# Patient Record
Sex: FEMALE | Race: WHITE | NOT HISPANIC OR LATINO | Employment: OTHER | URBAN - METROPOLITAN AREA
[De-identification: names, ages, dates, MRNs, and addresses within clinical notes are randomized per-mention and may not be internally consistent; named-entity substitution may affect disease eponyms.]

---

## 2020-02-06 ENCOUNTER — TRANSFERRED RECORDS (OUTPATIENT)
Dept: HEALTH INFORMATION MANAGEMENT | Facility: CLINIC | Age: 65
End: 2020-02-06

## 2020-02-06 ENCOUNTER — MEDICAL CORRESPONDENCE (OUTPATIENT)
Dept: HEALTH INFORMATION MANAGEMENT | Facility: CLINIC | Age: 65
End: 2020-02-06

## 2020-02-26 ENCOUNTER — DOCUMENTATION ONLY (OUTPATIENT)
Dept: NEUROLOGY | Facility: CLINIC | Age: 65
End: 2020-02-26

## 2020-05-05 ENCOUNTER — TELEPHONE (OUTPATIENT)
Dept: NEUROPSYCHOLOGY | Facility: CLINIC | Age: 65
End: 2020-05-05

## 2020-05-12 ENCOUNTER — VIRTUAL VISIT (OUTPATIENT)
Dept: NEUROPSYCHOLOGY | Facility: CLINIC | Age: 65
End: 2020-05-12
Payer: MEDICARE

## 2020-05-12 DIAGNOSIS — R41.3 MEMORY LOSS: ICD-10-CM

## 2020-05-12 DIAGNOSIS — F32.A DEPRESSION, UNSPECIFIED DEPRESSION TYPE: ICD-10-CM

## 2020-05-12 DIAGNOSIS — F03.90 SENILE DEMENTIA, UNCOMPLICATED (H): Primary | ICD-10-CM

## 2020-05-12 NOTE — LETTER
"2020       RE: Tena Sy  [address] [address] [address]     Dear Colleague,    Thank you for referring your patient, Tena Sy, to the [facility] NEUROPSYCHOLOGY at [facility]. Please see a copy of my visit note below.    Tena Sy is a 64 year old female who is being evaluated via a billable video visit.      The patient has been notified of following:     \"This video visit will be conducted via a call between you and your physician/provider. We have found that certain health care needs can be provided without the need for an in-person physical exam.  This service lets us provide the care you need with a video conversation. Video visits are billed at different rates depending on your insurance coverage.  Please reach out to your insurance provider with any questions.    If during the course of the call the provider feels a video visit is not appropriate, you will not be charged for this service.\"    Patient has given verbal consent for Video visit? Yes    Patient would like the video invitation sent by: Send to e-mail at: No e-mail address on record    Will anyone else be joining your video visit? No    NOTE: [employer] platform experienced significant technological issues so [email or web address] was utilized instead.     Video-Visit Details    Type of service:  Video Visit    Video Start Time: 8:11 AM  Video End Time: 8:46 AM  Formal Testin:50-10:35 AM    Originating Location (pt. Location): Home    Distant Location (provider location):  [facility] NEUROPSYCHOLOGY     Platform used for Video Visit: [email or web address]    RE: Tena Sy  MR#: [id number]  : 1955  DOS: May 12, 2020  KOSTA:  May 12, 2020    Neuropsychology Laboratory  [address] [address] [phone]    NEUROPSYCHOLOGICAL CONSULTATION      REASON FOR REFERRAL:  Tena Sy is a 64 year old female with 12 years of education who was referred for a " neuropsychological evaluation by Dr. Bryant to assess her cognitive and emotional functioning secondary to. The evaluation was requested in order to document her current level of functioning, assist with the differential diagnosis and provide appropriate recommendations to the patient, family and treatment team. The patient was informed that the evaluation included multiple measures of performance and symptom validity, and she was encouraged to provide her best effort at all times.  The nature of the neuropsychological evaluation was also discussed, including limits of confidentiality (for suspected child or elder abuse, potential homicide or suicide, and court orders). The patient was also informed that the report would be placed on the electronic medical records system. The patient was also given an opportunity to ask questions. The patient indicated that she understood the information and consented to participate in the evaluation.    Due to circumstances that prevent in-person clinical visits, this assessment was conducted using telehealth methods (including remote audiovisual presentation of test instructions and test stimuli). The standard administration of these procedures involves in-person, face-to-face methods. The impact of applying non-standard administration methods has been evaluated only in part by scientific research. While every effort was made to simulate standard assessment practices, the diagnostic conclusions and recommendations for treatment provided in this report are being advanced with these reservations.    PROCEDURES:   Review of records and clinical interview  Mental Status-orientation, Wide Range Achievement Test-4, Wechsler Adult Intelligence Scale-IV, Nieevs Verbal Learning Test-Revised, Clock Drawing Test, Verbal Fluency Test, Geriatric Depression Scale, Hines Anxiety Inventory, ILS Health and Safety Questions, BDAE Complex Ideational Material, RBANS, Oral Trailmaking Test, Test of  "Sustained Attention and Tracking and [place] Naming Test (15 Item version)    REVIEW OF RECORDS: Medical records dated February 6, 2020 noted diagnoses of anxiety, depression, hallucinations, and memory problems.  The record stated that about 3 years ago she began to experience memory loss and difficulty performing tasks.  The records noted she also experiencing difficulty remembering information that was told to her.  Records indicated family members then began to notice memory difficulties.  The record stated that \"recently, she showed up at her daughter's place and [place].  This was unusual.  When her daughter asked what she was doing, she told her she was going to [place] to visit her daughter.  It turned out that it was the daughter who she was talking to.  After few minutes, when asked the same question she gave the same answer.\"  Records also indicate that over the past 2 years she has been experiencing visual hallucinations which \"she describes this as a streak of something black that moves in front of her visual field.  This can be in any part of the visual field.  Recent examination by ophthalmology revealed floaters.  More recently she has been hearing sirens and voices.  When seen by audiology she was found to have mild hearing loss.  She has a longstanding history of sleeping difficulties.  She becomes anxious at night.  She does talk during sleep.  However, she does not remember her dreams.\"  The records further noted a long history of depression and anxiety that have been getting worse recently.  The records referenced a CT scan, which was reportedly negative.  Records noted significant medical history including hypothyroidism, iron deficiency anemia, spherocytosis, hemochromatosis, osteoarthritis, gastroesophageal reflux disease, osteoporosis, and essential tremor.  Records noted she was being treated with sertraline, raloxifene, propranolol, omeprazole, fish oil, meclizine, Synthroid, hydroxyzine, " "ferrous sulfate, Carboxymethylcellulose, multivitamin, calcium and vitamin D3.     Medical records dated February 6, 2020 noted diagnoses of anxiety, depression, hallucinations, and memory problems.  The record stated that about 3 years ago she began to experience memory loss and difficulty performing tasks.  The records said she also difficulty remembering information that was told to her.  Record indicated family members then began to notice memory difficulties.  The records stated that \"recently, she showed up at her daughter's place and [place].  This was unusual.  When her daughter asked what she was doing, she told her she was going to [place] to visit her daughter.  It turned out that it was the daughter who she was talking to.  After few minutes, when asked the same question she gave the same answer.\"  Records also indicate that over the past 2 years she has been experiencing visual hallucinations which \"she describes this as a streak of something black that moves in front of her visual field.  This can be in any part of the visual field.  Recent examination by ophthalmology revealed floaters.  More recently she has been hearing sirens and voices.  When seen by audiology she was found to have mild hearing loss.  She has a longstanding history of sleeping difficulties.  She becomes anxious at night.  She does talk during sleep.  However, she does not remember her dreams.\"  The records further noted a long history of depression and anxiety that have been getting worse recently.  The records referenced a CT scan, which was reportedly negative.  Records noted significant medical history including hypothyroidism, iron deficiency anemia, spherocytosis, hemochromatosis, osteoarthritis, gastroesophageal reflux disease, osteoporosis, and essential tremor.  Records know she has a history of cataract surgery, cholecystectomy, total abdominal hysterectomy and bilateral salpingo-oophorectomy.      CLINICAL INTERVIEW: " The patient was interviewed via [email or web address] video platform because the [employer] video platform had technological issues.  The patient's daughter, Mamie, and her  were also present.  On interview, the patient acknowledged difficulty with her memory, consistent with medical records.  She initially noted memory difficulty over the past year, however her daughter stated that the patient began having difficulty at work about 3 years ago.  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  The patient confirmed this issue and indicated that she had to stop working because of these difficulties.  The daughter noted that the family first observed the patient's memory difficulties in the fall and winter of 2019.  The daughter provided one example when the patient was speaking to her but was also referring to her in the third person.  The patient reported that she has difficulty remembering where she places items.  The daughter also noted the patient will forget things that she has had previously.  Additionally, the daughter noted the patient will forget when she has visited people.  The daughter noted a recent example where the patient forgot a visit and conversation they had just 2 days later.  The patient noted occasional difficulty with attention and concentration, but denied difficulty with decision-making and problem-solving.  The patient reported that she currently does not drive, but she has a 's license and an automobile.  The daughter noted that the  is usually drives because he is also retired.  The patient noted that she manages finances along with her , and her  does the checkbook balancing.  The patient denied difficulty with managing her medications and indicated that she uses a pillbox.  She also denied any difficulty with basic activities of daily living such as cooking, cleaning, and other household  "chores.    The patient and her daughter noted the patient has a history of depression and anxiety.  The patient reported that these difficulties began around the time she retired about 3 years ago.  In terms of her current mood, the patient said it was \"about 50.\".  When asked to elaborate, the daughter said that about half of the patient's days are good and half the days she is in a bad mood.  The daughter also noted the patient gets \"a lot\" of sleep, sometimes sleeping till 11:00 AM or noon.  The patient noted that she typically goes to bed at 9 PM.  The patient indicated last night was \"rough\" because she was thinking about this appointment.  The patient characterized her appetite is \"fair.\"  The daughter noted that the patient will sometimes forget to eat or not want to eat.  For example, the daughter noted the patient has had 2 instances where she had to go to the emergency department because she fainted due to dehydration and not eating enough.  The patient denied any current contact with mental health professionals.  However, the daughter noted the patient has previously worked with a psychotherapist, but the psychotherapist felt she did not have to come back.  The patient denied any suicidal or homicidal ideation, denied any history of suicide attempts.  The patient acknowledged the hallucinations, the daughter noted that floaters likely accounted for the shadows that the patient was seen.  However, they also noted that the patient has experiences where she will occasionally hear family members speaking to her when the family members are not present.  The patient denied any use of alcohol, tobacco, or illicit substances.    Medically, the daughter noted the patient has a significant family history for dementia, including the patient's mother who was diagnosed with dementia at age 62 and the patient's older sister who was diagnosed with Alzheimer's disease, also at age 62.  They noted that the elder sister " recently passed away at age 69.  The daughter noted the patient also has a history of anemia and a blood disorder that has not been diagnosed.  The daughter noted the patient has a history of low iron and hemoglobin, but recently this is been running higher.  The daughter also noted that the last episode where the patient fainted she struck her head, but recent CT scans and MRI scans did not indicate evidence for central nervous system injury.  The patient denied history of other traumatic brain injury.  They also denied any history multiple sclerosis, stroke, seizures, hypercholesterolemia, sleep apnea, diabetes, heart disease, cancer, liver disease, or kidney disease for the patient.  The daughter noted the patient's takes medication for hypertension and has had bilateral cataract surgery.  The daughter also noted the patient takes levothyroxine for hypothyroidism.  They noted the patient is also being treated with propanolol, sertraline, hydroxyzine, raloxifene and an over the counter antacid.  The patient and her daughter noted that she has a mild tremor in her hand, and there is a family history of this tremor.  They noted that it was unclear if this was a neurological issue or related to anxiety, since it becomes worse when she is anxious.  The patient reported that she wears eyeglasses for reading and denied any hearing problems.    The patient reported that she graduated from high school and was in special education for all subjects when in school.  She reported that reading was her best subject but math was her worst subject.  She denied ever having to repeat a grade.  The patient noted that she has 2 children, including a son and a daughter, and 4 grandchildren.  The patient's daughter, Mamie, indicated that she helps the patient manage appointments and coordinate her care.  The patient indicated that she lives at home with her .  Her  noted that they will be  47 years in June.  When  "asked her age, the patient could not recall it, but her  noted she was 64 years old.    BEHAVIORAL OBSERVATIONS:  On examination, the patient was casually but appropriately dressed and groomed. The patient was cooperative with the evaluation and was talkative. The patient appeared to put forth her best effort on all tasks. Thus, the results of this evaluation are a reasonably valid reflection of the patient s current level of functioning. There were no indications of hallucinations, delusions or unusual thought processes. The patient was generally well oriented and her affect was appropriate.  The patient also made occasional paraphasic errors.  At one point the patient said \"what people say is overbound\" and at another point when asked about her mood she said it was \"about 50.\"  Per standard protocol, it was requested that the patient participate in the evaluation in a quiet room without distractions and be alone.  However, family members were observed on the video screen behind the patient had several points during formal testing.  At one point the patient became distracted by a family member, but otherwise she was attentive to the psychometrost and the test materials.    RESULTS: Formal performance validity measures were generally within expected limits and consistent with the above-noted observations.  Psychometric estimates of the patient's premorbid intellectual functioning were in the mildly impaired range based on single word reading ability and her vocabulary ability.  Single word reading ability was at the third grade level, which is generally consistent with her educational history of special education involvement while in school.    Measures of attention/concentration were generally in the impaired range.  For example, a digit span task was in the mildly impaired range.  A simple oral sequencing test integrates attention was also in the mildly to moderately impaired range.  Another measure of " timed attention and processing speed was in the severely impaired range.    Measures the patient's memory functioning were also in the impaired range.  For example, there was evidence for significant deficits with encoding, storing, and retrieving previously learned verbal information on both a word list learning task and a story memory task.  A recognition format did not improve her performance.  There was evidence for rapid forgetting of previously learned information.    Expressive language functioning was mildly variable, but generally consistent with her estimated levels of global cognitive functioning.  For example, confrontation naming was within expected limits.  Phonemic fluency was consistent with her estimated premorbid levels of global cognitive functioning, but semantic fluency was poorer than expected.  Receptive language functioning, based on a complex ideational material task, was also in the impaired range.  A clock drawing task indicated evidence for significant visual-spatial distortions.  Likewise, motor-free line orientation judgment and motor-free visual reasoning were in the impaired range.    Measures of the patient's executive functioning were also in the impaired range.  For example, verbal and visual reasoning abilities were in the mildly impaired range.  Additionally, verbal fluency was poorer than expected, particular for semantic fluency.  A oral sequencing task that integrates cognitive flexibility was in the severely impaired range and could not be completed by the patient.  Finally, a measure of functional awareness of basic health and safety issues was also in the impaired range.  For example, she was not able to articulate strategies of what to do if she could not hear people in a conversation, and she did not know what to do if she experienced rapid weight loss.  She also could not articulate to ways to safely cross a busy street and she could not articulate 3 ways to take care of  herself physically.    Assessment of the patient's emotional functioning was completed utilizing the clinical interview and self-report measures of depression and anxiety.  On the self-report measures, the patient reported severe symptoms of anxiety and mild depressive symptoms.  This is generally consistent with her self-report during the interview, and the family's report.    SUMMARY:  The following opinions and recommendations are based on the interview and formal testing data obtained via telephone, thus all results were interpreted with caution.  In summary, the neuropsychological assessment results indicated estimated premorbid intellectual functioning was in the mildly impaired range, but this is consistent with her reported history of special education involvement while in school and is unlikely to represent a decline in global cognitive functioning.  However, there was evidence for severe deficits with all aspects of memory functioning, including encoding, storing, and retrieving previously learned verbal information.  There was also evidence for rapid forgetting of previously learned information.  Attention/concentration, visual-spatial abilities, and executive functioning were also in the impaired range.  Language processing ability was relatively better but still variable. The extent of her cognitive deficits are greater than could be accounted for by her premorbid levels of global cognitive functioning and indicated a decline in several cognitive domains. 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  The specific etiology of her cognitive deficits could not be determined based upon this evaluation alone. Although she noted significant emotional distress including depression and anxiety, the pattern and extent of cognitive difficulties is greater than can be accounted for by psychiatric issues alone.  Nonetheless, the results  indicated the patient also met criteria for a depressive disorder with prominent anxiety symptoms. Other medical issues, such as hypothyroidism and sleep difficulties, could also be contributing to deficits her cognitive functioning but are unlikely to be the primary factor.    RECOMMENDATIONS:   1.  Given these results, continued neurological consultation and follow-up is strongly recommended.  2. Safety is a significant concern.  She will likely require a high level of supervision to her daily activities, and 24-hour supervision is recommended.  It is strongly recommended that the patient not reside alone.  3.  Given the patient's significant cognitive deficits, it is recommended that the patient avoid driving due to the high risk of accidents and/or becoming lost.  4.  Further, other potentially dangerous activities, such as cooking and use of power tools, should be avoided or closely supervised.  5. Pharmacological intervention to address the cognitive decline might be considered, if not medically contraindicated.    6. A full medical evaluation of any treatable causes of cognitive decline is also recommended, if this has not already been accomplished.  Given the significant cognitive decline, a full evaluation of any infectious processes, vitamin deficiencies or other metabolic abnormalities is recommended, to rule out these as possible contributors.   7. A referral for a support group for individuals with cognitive decline and their families, such as the Alzheimer s [employer], is also recommended. The web site for the [place] branch of this organization is https://www.alz.org/mnnd.   8.  A referral for psychiatric consultation is recommended given the extent of her emotional difficulties. Evaluation of psychotropic medications to address her depressive and anxiety symptoms should be considered.  9.  Psychotherapeutic intervention likely would also be helpful.  A highly structured behaviorally based  intervention focused on coping and stress management likely would be the most helpful for her.  Referral to a psychologist or psychotherapist with experiencing working with people with cognitive disorders likely would be the most helpful for her.  10. The patient would benefit from case management and in-home services to assist her in compensating for her difficulties with organizational ability and attention. Thus, a referral to the Elderly Waiver program is recommended, given her psychiatric and cognitive issues. Utilization of an adult rehabilitative mental health services (ARMHS) worker would be particularly beneficial for her.  11. Have a trusted loved one present during medical appointments to facilitate memory for information provided and/or obtain written outlines of information to reduce demands on verbal memory.  12. The results of the evaluation now constitute a baseline of the patient s cognitive functioning.  Given the evidence for significant deficits in several domains of cognitive functioning, a referral for a neuropsychological reevaluation in approximately 8-10 months is recommended in order to continue to clarify the differential diagnosis, document any changes in cognitive functioning, and provide further recommendations and prognosis to the patient, family and treatment team.    Results were provided in telephone calls to the patient and the patient's daughter Mamie (with the patient's permission) on 5//12/2020. Their questions were answered. Thank you for referring this interesting individual.  If you have any questions, please feel free to contact me.      Julio César Rudolph, PhD, ABPP, LP  Professor and Licensed Psychologist [id number]  Board Certified Clinical Neuropsychologist  Phone: [phone]  Fax: [phone]    Time Spent:    Minutes Date Code Units   Total Time-Neuropsychologist Professional 252 5/12/20 47175 1      54433 3   Neuropsychologist Admin/scoring 0 5/12/20 14007 0      13832  0   Diagnostic Interview 35 5/12/20 35392 1   Psychometrician Time-Test Administration/Score 159 5/12/20 32409 1      43294 4   Diagnosis F03.90 R41.3 F32.9

## 2020-05-12 NOTE — PROGRESS NOTES
The patient was seen for neuropsychological evaluation via telehealth at the request of Major Bryant MD for the purposes of diagnostic clarification and treatment planning.  159 minutes of video test administration and scoring were provided by this writer.  Please see Dr. Julio César Rudolph's report for a full interpretation of the findings.    Video Start Time: 9:00 AM  Video End Time: 10:35 AM    Monica Arroyo  Psychometrist

## 2020-05-12 NOTE — PROGRESS NOTES
"Tena Sy is a 64 year old female who is being evaluated via a billable video visit.      The patient has been notified of following:     \"This video visit will be conducted via a call between you and your physician/provider. We have found that certain health care needs can be provided without the need for an in-person physical exam.  This service lets us provide the care you need with a video conversation. Video visits are billed at different rates depending on your insurance coverage.  Please reach out to your insurance provider with any questions.    If during the course of the call the provider feels a video visit is not appropriate, you will not be charged for this service.\"    Patient has given verbal consent for Video visit? Yes    Patient would like the video invitation sent by: Send to e-mail at: No e-mail address on record    Will anyone else be joining your video visit? No    NOTE: [employer] video platform experienced significant technological issues so [email or web address] was utilized instead.     Video-Visit Details    Type of service:  Video Visit    Video Start Time: 8:11 AM  Video End Time: 8:46 AM  Formal Testin:50-10:35 AM    Originating Location (pt. Location): Home    Distant Location (provider location):  [facility]     Platform used for Video Visit: [email or web address]    RE: Tena Sy  MR#: [id number]  : 1955  DOS: May 12, 2020  KOSTA:  May 12, 2020    Ne[facility]ology [facility]  [address] [address] [phone]    NEUROPSYCHOLOGICAL CONSULTATION      REASON FOR REFERRAL:  Tena Sy is a 64 year old female with 12 years of education who was referred for a neuropsychological evaluation by Dr. Bryant to assess her cognitive and emotional functioning secondary to. The evaluation was requested in order to document her current level of functioning, assist with the differential diagnosis and provide appropriate recommendations to the " patient, family and treatment team. The patient was informed that the evaluation included multiple measures of performance and symptom validity, and she was encouraged to provide her best effort at all times.  The nature of the neuropsychological evaluation was also discussed, including limits of confidentiality (for suspected child or elder abuse, potential homicide or suicide, and court orders). The patient was also informed that the report would be placed on the electronic medical records system. The patient was also given an opportunity to ask questions. The patient indicated that she understood the information and consented to participate in the evaluation.    Due to circumstances that prevent in-person clinical visits, this assessment was conducted using telehealth methods (including remote audiovisual presentation of test instructions and test stimuli). The standard administration of these procedures involves in-person, face-to-face methods. The impact of applying non-standard administration methods has been evaluated only in part by scientific research. While every effort was made to simulate standard assessment practices, the diagnostic conclusions and recommendations for treatment provided in this report are being advanced with these reservations.    PROCEDURES:   Review of records and clinical interview  Mental Status-orientation, Wide Range Achievement Test-4, Wechsler Adult Intelligence Scale-IV, Nieves Verbal Learning Test-Revised, Clock Drawing Test, Verbal Fluency Test, Geriatric Depression Scale, Hines Anxiety Inventory, ILS Health and Safety Questions, BDAE Complex Ideational Material, RBANS, Oral Trailmaking Test, Test of Sustained Attention and Tracking and Merigold Naming Test (15 Item version)    REVIEW OF RECORDS: Medical records dated February 6, 2020 noted diagnoses of anxiety, depression, hallucinations, and memory problems.  The record stated that about 3 years ago she began to experience  "memory loss and difficulty performing tasks.  The records noted she also experiencing difficulty remembering information that was told to her.  Records indicated family members then began to notice memory difficulties.  The record stated that \"recently, she showed up at her daughter's place and [place].  This was unusual.  When her daughter asked what she was doing, she told her she was going to [place] to visit her daughter.  It turned out that it was the daughter who she was talking to.  After few minutes, when asked the same question she gave the same answer.\"  Records also indicate that over the past 2 years she has been experiencing visual hallucinations which \"she describes this as a streak of something black that moves in front of her visual field.  This can be in any part of the visual field.  Recent examination by ophthalmology revealed floaters.  More recently she has been hearing sirens and voices.  When seen by audiology she was found to have mild hearing loss.  She has a longstanding history of sleeping difficulties.  She becomes anxious at night.  She does talk during sleep.  However, she does not remember her dreams.\"  The records further noted a long history of depression and anxiety that have been getting worse recently.  The records referenced a CT scan, which was reportedly negative.  Records noted significant medical history including hypothyroidism, iron deficiency anemia, spherocytosis, hemochromatosis, osteoarthritis, gastroesophageal reflux disease, osteoporosis, and essential tremor.  Records noted she was being treated with sertraline, raloxifene, propranolol, omeprazole, fish oil, meclizine, Synthroid, hydroxyzine, ferrous sulfate, Carboxymethylcellulose, multivitamin, calcium and vitamin D3.     Medical records dated February 6, 2020 noted diagnoses of anxiety, depression, hallucinations, and memory problems.  The record stated that about 3 years ago she began to experience memory loss " "and difficulty performing tasks.  The records said she also difficulty remembering information that was told to her.  Record indicated family members then began to notice memory difficulties.  The records stated that \"recently, she showed up at her daughter's place and [place].  This was unusual.  When her daughter asked what she was doing, she told her she was going to [place] to visit her daughter.  It turned out that it was the daughter who she was talking to.  After few minutes, when asked the same question she gave the same answer.\"  Records also indicate that over the past 2 years she has been experiencing visual hallucinations which \"she describes this as a streak of something black that moves in front of her visual field.  This can be in any part of the visual field.  Recent examination by ophthalmology revealed floaters.  More recently she has been hearing sirens and voices.  When seen by audiology she was found to have mild hearing loss.  She has a longstanding history of sleeping difficulties.  She becomes anxious at night.  She does talk during sleep.  However, she does not remember her dreams.\"  The records further noted a long history of depression and anxiety that have been getting worse recently.  The records referenced a CT scan, which was reportedly negative.  Records noted significant medical history including hypothyroidism, iron deficiency anemia, spherocytosis, hemochromatosis, osteoarthritis, gastroesophageal reflux disease, osteoporosis, and essential tremor.  Records know she has a history of cataract surgery, cholecystectomy, total abdominal hysterectomy and bilateral salpingo-oophorectomy.      CLINICAL INTERVIEW: The patient was interviewed via [email or web address] video platform because the [employer] video platform had technological issues.  The patient's daughter, Mamie, and her  were also present.  On interview, the patient acknowledged difficulty with her memory, consistent with medical " "records.  She initially noted memory difficulty over the past year, however her daughter stated that the patient began having difficulty at work about 3 years ago.  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  The patient confirmed this issue and indicated that she had to stop working because of these difficulties.  The daughter noted that the family first observed the patient's memory difficulties in the fall and winter of 2019.  The daughter provided one example when the patient was speaking to her but was also referring to her in the third person.  The patient reported that she has difficulty remembering where she places items.  The daughter also noted the patient will forget things that she has had previously.  Additionally, the daughter noted the patient will forget when she has visited people.  The daughter noted a recent example where the patient forgot a visit and conversation they had just 2 days later.  The patient noted occasional difficulty with attention and concentration, but denied difficulty with decision-making and problem-solving.  The patient reported that she currently does not drive, but she has a 's license and an automobile.  The daughter noted that the  is usually drives because he is also retired.  The patient noted that she manages finances along with her , and her  does the checkbook balancing.  The patient denied difficulty with managing her medications and indicated that she uses a pillbox.  She also denied any difficulty with basic activities of daily living such as cooking, cleaning, and other household chores.    The patient and her daughter noted the patient has a history of depression and anxiety.  The patient reported that these difficulties began around the time she retired about 3 years ago.  In terms of her current mood, the patient said it was \"about 50.\".  When asked to " "elaborate, the daughter said that about half of the patient's days are good and half the days she is in a bad mood.  The daughter also noted the patient gets \"a lot\" of sleep, sometimes sleeping till 11:00 AM or noon.  The patient noted that she typically goes to bed at 9 PM.  The patient indicated last night was \"rough\" because she was thinking about this appointment.  The patient characterized her appetite is \"fair.\"  The daughter noted that the patient will sometimes forget to eat or not want to eat.  For example, the daughter noted the patient has had 2 instances where she had to go to the emergency department because she fainted due to dehydration and not eating enough.  The patient denied any current contact with mental health professionals.  However, the daughter noted the patient has previously worked with a psychotherapist, but the psychotherapist felt she did not have to come back.  The patient denied any suicidal or homicidal ideation, denied any history of suicide attempts.  The patient acknowledged the hallucinations, the daughter noted that floaters likely accounted for the shadows that the patient was seen.  However, they also noted that the patient has experiences where she will occasionally hear family members speaking to her when the family members are not present.  The patient denied any use of alcohol, tobacco, or illicit substances.    Medically, the daughter noted the patient has a significant family history for dementia, including the patient's mother who was diagnosed with dementia at age 62 and the patient's older sister who was diagnosed with Alzheimer's disease, also at age 62.  They noted that the elder sister recently passed away at age 69.  The daughter noted the patient also has a history of anemia and a blood disorder that has not been diagnosed.  The daughter noted the patient has a history of low iron and hemoglobin, but recently this is been running higher.  The daughter also noted " that the last episode where the patient fainted she struck her head, but recent CT scans and MRI scans did not indicate evidence for central nervous system injury.  The patient denied history of other traumatic brain injury.  They also denied any history multiple sclerosis, stroke, seizures, hypercholesterolemia, sleep apnea, diabetes, heart disease, cancer, liver disease, or kidney disease for the patient.  The daughter noted the patient's takes medication for hypertension and has had bilateral cataract surgery.  The daughter also noted the patient takes levothyroxine for hypothyroidism.  They noted the patient is also being treated with propanolol, sertraline, hydroxyzine, raloxifene and an over the counter antacid.  The patient and her daughter noted that she has a mild tremor in her hand, and there is a family history of this tremor.  They noted that it was unclear if this was a neurological issue or related to anxiety, since it becomes worse when she is anxious.  The patient reported that she wears eyeglasses for reading and denied any hearing problems.    The patient reported that she graduated from high school and was in special education for all subjects when in school.  She reported that reading was her best subject but math was her worst subject.  She denied ever having to repeat a grade.  The patient noted that she has 2 children, including a son and a daughter, and 4 grandchildren.  The patient's daughter, Mamie, indicated that she helps the patient manage appointments and coordinate her care.  The patient indicated that she lives at home with her .  Her  noted that they will be  47 years in June.  When asked her age, the patient could not recall it, but her  noted she was 64 years old.    BEHAVIORAL OBSERVATIONS:  On examination, the patient was casually but appropriately dressed and groomed. The patient was cooperative with the evaluation and was talkative. The patient  "appeared to put forth her best effort on all tasks. Thus, the results of this evaluation are a reasonably valid reflection of the patient s current level of functioning. There were no indications of hallucinations, delusions or unusual thought processes. The patient was generally well oriented and her affect was appropriate.  The patient also made occasional paraphasic errors.  At one point the patient said \"what people say is overbound\" and at another point when asked about her mood she said it was \"about 50.\"  Per standard protocol, it was requested that the patient participate in the evaluation in a quiet room without distractions and be alone.  However, family members were observed on the video screen behind the patient had several points during formal testing.  At one point the patient became distracted by a family member, but otherwise she was attentive to the psychometrost and the test materials.    RESULTS: Formal performance validity measures were generally within expected limits and consistent with the above-noted observations.  Psychometric estimates of the patient's premorbid intellectual functioning were in the mildly impaired range based on single word reading ability and her vocabulary ability.  Single word reading ability was at the third grade level, which is generally consistent with her educational history of special education involvement while in school.    Measures of attention/concentration were generally in the impaired range.  For example, a digit span task was in the mildly impaired range.  A simple oral sequencing test integrates attention was also in the mildly to moderately impaired range.  Another measure of timed attention and processing speed was in the severely impaired range.    Measures the patient's memory functioning were also in the impaired range.  For example, there was evidence for significant deficits with encoding, storing, and retrieving previously learned verbal " information on both a word list learning task and a story memory task.  A recognition format did not improve her performance.  There was evidence for rapid forgetting of previously learned information.    Expressive language functioning was mildly variable, but generally consistent with her estimated levels of global cognitive functioning.  For example, confrontation naming was within expected limits.  Phonemic fluency was consistent with her estimated premorbid levels of global cognitive functioning, but semantic fluency was poorer than expected.  Receptive language functioning, based on a complex ideational material task, was also in the impaired range.  A clock drawing task indicated evidence for significant visual-spatial distortions.  Likewise, motor-free line orientation judgment and motor-free visual reasoning were in the impaired range.    Measures of the patient's executive functioning were also in the impaired range.  For example, verbal and visual reasoning abilities were in the mildly impaired range.  Additionally, verbal fluency was poorer than expected, particular for semantic fluency.  A oral sequencing task that integrates cognitive flexibility was in the severely impaired range and could not be completed by the patient.  Finally, a measure of functional awareness of basic health and safety issues was also in the impaired range.  For example, she was not able to articulate strategies of what to do if she could not hear people in a conversation, and she did not know what to do if she experienced rapid weight loss.  She also could not articulate to ways to safely cross a busy street and she could not articulate 3 ways to take care of herself physically.    Assessment of the patient's emotional functioning was completed utilizing the clinical interview and self-report measures of depression and anxiety.  On the self-report measures, the patient reported severe symptoms of anxiety and mild depressive  symptoms.  This is generally consistent with her self-report during the interview, and the family's report.    SUMMARY:  The following opinions and recommendations are based on the interview and formal testing data obtained via telephone, thus all results were interpreted with caution.  In summary, the neuropsychological assessment results indicated estimated premorbid intellectual functioning was in the mildly impaired range, but this is consistent with her reported history of special education involvement while in school and is unlikely to represent a decline in global cognitive functioning.  However, there was evidence for severe deficits with all aspects of memory functioning, including encoding, storing, and retrieving previously learned verbal information.  There was also evidence for rapid forgetting of previously learned information.  Attention/concentration, visual-spatial abilities, and executive functioning were also in the impaired range.  Language processing ability was relatively better but still variable. The extent of her cognitive deficits are greater than could be accounted for by her premorbid levels of global cognitive functioning and indicated a decline in several cognitive domains. 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  The specific etiology of her cognitive deficits could not be determined based upon this evaluation alone. Although she noted significant emotional distress including depression and anxiety, the pattern and extent of cognitive difficulties is greater than can be accounted for by psychiatric issues alone.  Nonetheless, the results indicated the patient also met criteria for a depressive disorder with prominent anxiety symptoms. Other medical issues, such as hypothyroidism and sleep difficulties, could also be contributing to deficits her cognitive functioning but are unlikely to be the primary  factor.    RECOMMENDATIONS:   1.  Given these results, continued neurological consultation and follow-up is strongly recommended.  2. Safety is a significant concern.  She will likely require a high level of supervision to her daily activities, and 24-hour supervision is recommended.  It is strongly recommended that the patient not reside alone.  3.  Given the patient's significant cognitive deficits, it is recommended that the patient avoid driving due to the high risk of accidents and/or becoming lost.  4.  Further, other potentially dangerous activities, such as cooking and use of power tools, should be avoided or closely supervised.  5. Pharmacological intervention to address the cognitive decline might be considered, if not medically contraindicated.    6. A full medical evaluation of any treatable causes of cognitive decline is also recommended, if this has not already been accomplished.  Given the significant cognitive decline, a full evaluation of any infectious processes, vitamin deficiencies or other metabolic abnormalities is recommended, to rule out these as possible contributors.   7. A referral for a support group for individuals with cognitive decline and their families, such as the Alzheimer s [employer], is also recommended. The web site for the [place] branch of this organization is https://www.alz.org/mnnd.   8.  A referral for psychiatric consultation is recommended given the extent of her emotional difficulties. Evaluation of psychotropic medications to address her depressive and anxiety symptoms should be considered.  9.  Psychotherapeutic intervention likely would also be helpful.  A highly structured behaviorally based intervention focused on coping and stress management likely would be the most helpful for her.  Referral to a psychologist or psychotherapist with experiencing working with people with cognitive disorders likely would be the most helpful for her.  10. The patient would  benefit from case management and in-home services to assist her in compensating for her difficulties with organizational ability and attention. Thus, a referral to the Elderly Waiver program is recommended, given her psychiatric and cognitive issues. Utilization of an adult rehabilitative mental health services (ARMHS) worker would be particularly beneficial for her.  11. Have a trusted loved one present during medical appointments to facilitate memory for information provided and/or obtain written outlines of information to reduce demands on verbal memory.  12. The results of the evaluation now constitute a baseline of the patient s cognitive functioning.  Given the evidence for significant deficits in several domains of cognitive functioning, a referral for a neuropsychological reevaluation in approximately 8-10 months is recommended in order to continue to clarify the differential diagnosis, document any changes in cognitive functioning, and provide further recommendations and prognosis to the patient, family and treatment team.    Results were provided in telephone calls to the patient and the patient's daughter Mamie (with the patient's permission) on 5//12/2020. Their questions were answered. Thank you for referring this interesting individual.  If you have any questions, please feel free to contact me.      Julio César Rudolph, PhD, ABPP, LP  Professor and Licensed Psychologist [id number]  Board Certified Clinical Neuropsychologist  Phone: [phone]  Fax: [phone]    Time Spent:    Minutes Date Code Units   Total Time-Neuropsychologist Professional 252 5/12/20 63583 1      09368 3   Neuropsychologist Admin/scoring 0 5/12/20 [id number] 0      [id number] 0   Diagnostic Interview 35 5/12/20 21101 1   Psychometrician Time-Test Administration/Score 159 5/12/20 55949 1      [id number] 4   Diagnosis F03.90 R41.3 F32.9

## 2020-05-13 NOTE — PROGRESS NOTES
Face Sheet          Patient:  Tena Sy MRN:  [id number] :  51 GONZALEZ:  20   Education 12 (spec. ed) Handedness:   Provider DW Psychometrist:  NN   Orientation  WRAT-4  WAIS-IV Raw SS RDS   Personal Info 3 Raw 36 Digit Span 14 4 7   Place 1 SS 68 Vocabulary 15 5    Time -8 %ile 2 Matrix Reasoning 6 5    Presidents 1 Grade Equivalence 3.4 Similarities 7 3    BNT-15  COWAT - FAS  Animal Fluency      Raw 12 Raw 19 Raw 9     z -0.52 SS 5 SS 4     Total Stim Correct 0 T 32 T 27     Total Phonemic Correct 1         Complex Ideational Material  Clock Drawing  RBANS Line Orientation      Raw 8 Command 1 Raw 6     SS 3 Copy 1 z -3.66     T 15   %ile <2     Oral Trails    TSAT      Trails A 9 Z -2.60 Total time 236 Z -4.00   Trails B D/C 33 Z NA Total Errors 16 Z -5.48   HVLT           Trial 1 2   T-Score   T-Score   Trial 2 2 Total Recall 9 <20 True Positives 11    Trial 3 5 Delayed Recall 2 <20 False Positives 7    Learning 3 Percent Retention 40% <20 Discrim. Index 4 <20   RBANS Story           Total Immediate 10 z Score -2.40 Scaled Score 1     Total Delay 1 z Score -3.95 Scaled Score 2     ILS Health and Safety Questionnaire         Total 29 Classification Low       SHANDA          Total 44 Interpretation Severe       GDS          Total 12 Interpretation Mild/moderate

## 2025-02-17 RX ORDER — OMEPRAZOLE 20 MG/1
20 CAPSULE, DELAYED RELEASE ORAL
COMMUNITY

## 2025-02-17 RX ORDER — PRIMIDONE 50 MG/1
50 TABLET ORAL AT BEDTIME
COMMUNITY

## 2025-02-17 RX ORDER — LORAZEPAM 0.5 MG/1
0.5 TABLET ORAL DAILY PRN
COMMUNITY

## 2025-02-17 RX ORDER — PROPRANOLOL HYDROCHLORIDE 60 MG/1
60 CAPSULE, EXTENDED RELEASE ORAL DAILY
COMMUNITY

## 2025-02-17 RX ORDER — RALOXIFENE HYDROCHLORIDE 60 MG/1
60 TABLET, FILM COATED ORAL DAILY
COMMUNITY

## 2025-02-17 RX ORDER — LEVOTHYROXINE SODIUM 112 UG/1
112 TABLET ORAL
COMMUNITY

## 2025-02-17 RX ORDER — PSEUDOEPHEDRINE HCL 30 MG
2 TABLET ORAL 2 TIMES DAILY WITH MEALS
COMMUNITY

## 2025-02-17 RX ORDER — FOLIC ACID 0.8 MG
800 TABLET ORAL DAILY
COMMUNITY

## 2025-02-17 RX ORDER — MULTIVITAMIN
1 TABLET ORAL DAILY
COMMUNITY

## 2025-02-17 RX ORDER — CHLORAL HYDRATE 500 MG
1 CAPSULE ORAL DAILY
COMMUNITY

## 2025-02-17 RX ORDER — MEMANTINE HYDROCHLORIDE 14 MG/1
14 CAPSULE, EXTENDED RELEASE ORAL DAILY
COMMUNITY

## 2025-02-17 RX ORDER — ESCITALOPRAM OXALATE 20 MG/1
20 TABLET ORAL AT BEDTIME
COMMUNITY

## 2025-02-17 RX ORDER — MIRTAZAPINE 15 MG/1
15 TABLET, FILM COATED ORAL AT BEDTIME
COMMUNITY

## 2025-02-17 RX ORDER — DONEPEZIL HYDROCHLORIDE 10 MG/1
10 TABLET, FILM COATED ORAL DAILY
COMMUNITY

## 2025-02-17 NOTE — PROGRESS NOTES
PTA medications updated by Medication Scribe prior to surgery via phone call with patient (last doses completed by Nurse)     Medication history sources: Patient's family/friend (Daughter: Mamie) and H&P  In the past week, patient estimated taking medication this percent of the time: Greater than 90%      Significant changes made to the medication list:  None      Additional medication history information:   None    Medication reconciliation completed by provider prior to medication history? No    Time spent in this activity: 35 minutes    The information provided in this note is only as accurate as the sources available at the time of update(s)      Prior to Admission medications    Medication Sig Last Dose Taking? Auth Provider Long Term End Date   calcium citrate 250 MG TABS Take 2 tablets by mouth 2 times daily (with meals).  Yes Reported, Patient     donepezil (ARICEPT) 10 MG tablet Take 10 mg by mouth daily. Evening Yes Reported, Patient     escitalopram (LEXAPRO) 20 MG tablet Take 20 mg by mouth at bedtime. Bedtime Yes Reported, Patient Yes    fish oil-omega-3 fatty acids (OMEGA-3 FISH OIL) 1000 MG capsule Take 1 g by mouth daily.  Yes Reported, Patient     folic acid 800 MCG tablet Take 800 mcg by mouth daily.  Yes Reported, Patient     levothyroxine (SYNTHROID/LEVOTHROID) 112 MCG tablet Take 112 mcg by mouth every morning (before breakfast). Morning Yes Reported, Patient Yes    LORazepam (ATIVAN) 0.5 MG tablet Take 0.5 mg by mouth daily as needed for anxiety.  Yes Reported, Patient     memantine XR (NAMENDA XR) 14 MG 24 hr capsule Take 14 mg by mouth daily.  Yes Reported, Patient     mirtazapine (REMERON) 15 MG tablet Take 15 mg by mouth at bedtime.  Yes Reported, Patient Yes    multivitamin w/minerals (MULTI-VITAMIN) tablet Take 1 tablet by mouth daily.  Yes Reported, Patient     omeprazole (PRILOSEC) 20 MG DR capsule Take 20 mg by mouth 2 times daily (before meals).  Yes Reported, Patient     primidone  (MYSOLINE) 50 MG tablet Take 50 mg by mouth at bedtime. Bedtime Yes Reported, Patient Yes    propranolol ER (INDERAL LA) 60 MG 24 hr capsule Take 60 mg by mouth daily. Morning Yes Reported, Patient Yes    raloxifene (EVISTA) 60 MG tablet Take 60 mg by mouth daily. Morning Yes Reported, Patient Yes    vitamin B-12 (CYANOCOBALAMIN) 1000 MCG tablet Take 1,000 mcg by mouth daily.  Yes Reported, Patient         Medication history completed by: Micaela Gonzalez

## 2025-02-19 ENCOUNTER — ANESTHESIA EVENT (OUTPATIENT)
Dept: SURGERY | Facility: CLINIC | Age: 70
End: 2025-02-19
Payer: COMMERCIAL

## 2025-02-19 ENCOUNTER — HOSPITAL ENCOUNTER (INPATIENT)
Facility: CLINIC | Age: 70
DRG: 348 | End: 2025-02-19
Attending: COLON & RECTAL SURGERY | Admitting: COLON & RECTAL SURGERY
Payer: COMMERCIAL

## 2025-02-19 ENCOUNTER — ANESTHESIA (OUTPATIENT)
Dept: SURGERY | Facility: CLINIC | Age: 70
End: 2025-02-19
Payer: COMMERCIAL

## 2025-02-19 DIAGNOSIS — K62.3 RECTAL PROLAPSE: Primary | ICD-10-CM

## 2025-02-19 LAB
ABO + RH BLD: NORMAL
BLD GP AB SCN SERPL QL: NEGATIVE
COLONOSCOPY: NORMAL
CREAT SERPL-MCNC: 0.9 MG/DL (ref 0.51–0.95)
EGFRCR SERPLBLD CKD-EPI 2021: 68 ML/MIN/1.73M2
GLUCOSE SERPL-MCNC: 167 MG/DL (ref 70–99)
HGB BLD-MCNC: 9.5 G/DL (ref 11.7–15.7)
PLATELET # BLD AUTO: 233 10E3/UL (ref 150–450)
POTASSIUM SERPL-SCNC: 2.8 MMOL/L (ref 3.4–5.3)
SPECIMEN EXP DATE BLD: NORMAL

## 2025-02-19 PROCEDURE — 36415 COLL VENOUS BLD VENIPUNCTURE: CPT | Performed by: COLON & RECTAL SURGERY

## 2025-02-19 PROCEDURE — 88305 TISSUE EXAM BY PATHOLOGIST: CPT | Mod: TC | Performed by: COLON & RECTAL SURGERY

## 2025-02-19 PROCEDURE — 85049 AUTOMATED PLATELET COUNT: CPT | Performed by: COLON & RECTAL SURGERY

## 2025-02-19 PROCEDURE — 250N000009 HC RX 250: Performed by: COLON & RECTAL SURGERY

## 2025-02-19 PROCEDURE — 0DBL8ZX EXCISION OF TRANSVERSE COLON, VIA NATURAL OR ARTIFICIAL OPENING ENDOSCOPIC, DIAGNOSTIC: ICD-10-PCS | Performed by: COLON & RECTAL SURGERY

## 2025-02-19 PROCEDURE — 250N000009 HC RX 250: Performed by: ANESTHESIOLOGY

## 2025-02-19 PROCEDURE — 250N000011 HC RX IP 250 OP 636: Performed by: COLON & RECTAL SURGERY

## 2025-02-19 PROCEDURE — 120N000001 HC R&B MED SURG/OB

## 2025-02-19 PROCEDURE — 85018 HEMOGLOBIN: CPT | Performed by: COLON & RECTAL SURGERY

## 2025-02-19 PROCEDURE — 86923 COMPATIBILITY TEST ELECTRIC: CPT | Performed by: STUDENT IN AN ORGANIZED HEALTH CARE EDUCATION/TRAINING PROGRAM

## 2025-02-19 PROCEDURE — 0DBP7ZZ EXCISION OF RECTUM, VIA NATURAL OR ARTIFICIAL OPENING: ICD-10-PCS | Performed by: COLON & RECTAL SURGERY

## 2025-02-19 PROCEDURE — 250N000011 HC RX IP 250 OP 636: Performed by: ANESTHESIOLOGY

## 2025-02-19 PROCEDURE — 272N000001 HC OR GENERAL SUPPLY STERILE: Performed by: COLON & RECTAL SURGERY

## 2025-02-19 PROCEDURE — 258N000003 HC RX IP 258 OP 636: Performed by: ANESTHESIOLOGY

## 2025-02-19 PROCEDURE — 360N000077 HC SURGERY LEVEL 4, PER MIN: Performed by: COLON & RECTAL SURGERY

## 2025-02-19 PROCEDURE — 82565 ASSAY OF CREATININE: CPT | Performed by: SURGERY

## 2025-02-19 PROCEDURE — 88307 TISSUE EXAM BY PATHOLOGIST: CPT | Mod: TC | Performed by: COLON & RECTAL SURGERY

## 2025-02-19 PROCEDURE — 250N000013 HC RX MED GY IP 250 OP 250 PS 637: Performed by: COLON & RECTAL SURGERY

## 2025-02-19 PROCEDURE — 250N000011 HC RX IP 250 OP 636: Mod: JZ | Performed by: COLON & RECTAL SURGERY

## 2025-02-19 PROCEDURE — 258N000003 HC RX IP 258 OP 636: Performed by: SURGERY

## 2025-02-19 PROCEDURE — P9045 ALBUMIN (HUMAN), 5%, 250 ML: HCPCS | Performed by: ANESTHESIOLOGY

## 2025-02-19 PROCEDURE — 710N000009 HC RECOVERY PHASE 1, LEVEL 1, PER MIN: Performed by: COLON & RECTAL SURGERY

## 2025-02-19 PROCEDURE — 250N000013 HC RX MED GY IP 250 OP 250 PS 637: Performed by: ANESTHESIOLOGY

## 2025-02-19 PROCEDURE — 84132 ASSAY OF SERUM POTASSIUM: CPT | Performed by: ANESTHESIOLOGY

## 2025-02-19 PROCEDURE — 86900 BLOOD TYPING SEROLOGIC ABO: CPT | Performed by: COLON & RECTAL SURGERY

## 2025-02-19 PROCEDURE — 370N000017 HC ANESTHESIA TECHNICAL FEE, PER MIN: Performed by: COLON & RECTAL SURGERY

## 2025-02-19 PROCEDURE — 250N000025 HC SEVOFLURANE, PER MIN: Performed by: COLON & RECTAL SURGERY

## 2025-02-19 PROCEDURE — 250N000011 HC RX IP 250 OP 636: Performed by: SURGERY

## 2025-02-19 PROCEDURE — 0W3P8ZZ CONTROL BLEEDING IN GASTROINTESTINAL TRACT, VIA NATURAL OR ARTIFICIAL OPENING ENDOSCOPIC: ICD-10-PCS | Performed by: COLON & RECTAL SURGERY

## 2025-02-19 PROCEDURE — 999N000141 HC STATISTIC PRE-PROCEDURE NURSING ASSESSMENT: Performed by: COLON & RECTAL SURGERY

## 2025-02-19 PROCEDURE — 82947 ASSAY GLUCOSE BLOOD QUANT: CPT | Performed by: ANESTHESIOLOGY

## 2025-02-19 PROCEDURE — 250N000013 HC RX MED GY IP 250 OP 250 PS 637: Performed by: SURGERY

## 2025-02-19 RX ORDER — METRONIDAZOLE 500 MG/100ML
500 INJECTION, SOLUTION INTRAVENOUS
Status: COMPLETED | OUTPATIENT
Start: 2025-02-19 | End: 2025-02-19

## 2025-02-19 RX ORDER — ESCITALOPRAM OXALATE 20 MG/1
20 TABLET ORAL AT BEDTIME
Status: DISCONTINUED | OUTPATIENT
Start: 2025-02-19 | End: 2025-02-22 | Stop reason: HOSPADM

## 2025-02-19 RX ORDER — PROPOFOL 10 MG/ML
INJECTION, EMULSION INTRAVENOUS CONTINUOUS PRN
Status: DISCONTINUED | OUTPATIENT
Start: 2025-02-19 | End: 2025-02-19

## 2025-02-19 RX ORDER — PANTOPRAZOLE SODIUM 40 MG/1
40 TABLET, DELAYED RELEASE ORAL
Status: DISCONTINUED | OUTPATIENT
Start: 2025-02-19 | End: 2025-02-22 | Stop reason: HOSPADM

## 2025-02-19 RX ORDER — LIDOCAINE 40 MG/G
CREAM TOPICAL
Status: DISCONTINUED | OUTPATIENT
Start: 2025-02-19 | End: 2025-02-22 | Stop reason: HOSPADM

## 2025-02-19 RX ORDER — ACETAMINOPHEN 325 MG/1
975 TABLET ORAL ONCE
Status: COMPLETED | OUTPATIENT
Start: 2025-02-19 | End: 2025-02-19

## 2025-02-19 RX ORDER — CEFAZOLIN SODIUM 2 G/50ML
2 SOLUTION INTRAVENOUS EVERY 8 HOURS
Status: COMPLETED | OUTPATIENT
Start: 2025-02-19 | End: 2025-02-20

## 2025-02-19 RX ORDER — ONDANSETRON 4 MG/1
4 TABLET, ORALLY DISINTEGRATING ORAL EVERY 30 MIN PRN
Status: DISCONTINUED | OUTPATIENT
Start: 2025-02-19 | End: 2025-02-19 | Stop reason: HOSPADM

## 2025-02-19 RX ORDER — LORAZEPAM 0.5 MG/1
0.5 TABLET ORAL ONCE
Status: DISCONTINUED | OUTPATIENT
Start: 2025-02-19 | End: 2025-02-19 | Stop reason: DRUGHIGH

## 2025-02-19 RX ORDER — HYDROMORPHONE HCL IN WATER/PF 6 MG/30 ML
0.2 PATIENT CONTROLLED ANALGESIA SYRINGE INTRAVENOUS EVERY 5 MIN PRN
Status: DISCONTINUED | OUTPATIENT
Start: 2025-02-19 | End: 2025-02-19 | Stop reason: HOSPADM

## 2025-02-19 RX ORDER — DONEPEZIL HYDROCHLORIDE 10 MG/1
10 TABLET, FILM COATED ORAL EVERY EVENING
Status: DISCONTINUED | OUTPATIENT
Start: 2025-02-19 | End: 2025-02-22 | Stop reason: HOSPADM

## 2025-02-19 RX ORDER — NALOXONE HYDROCHLORIDE 0.4 MG/ML
0.4 INJECTION, SOLUTION INTRAMUSCULAR; INTRAVENOUS; SUBCUTANEOUS
Status: DISCONTINUED | OUTPATIENT
Start: 2025-02-19 | End: 2025-02-22 | Stop reason: HOSPADM

## 2025-02-19 RX ORDER — HYDROMORPHONE HCL IN WATER/PF 6 MG/30 ML
0.1 PATIENT CONTROLLED ANALGESIA SYRINGE INTRAVENOUS EVERY 4 HOURS PRN
Status: DISCONTINUED | OUTPATIENT
Start: 2025-02-19 | End: 2025-02-22 | Stop reason: HOSPADM

## 2025-02-19 RX ORDER — DEXMEDETOMIDINE HYDROCHLORIDE 4 UG/ML
INJECTION, SOLUTION INTRAVENOUS PRN
Status: DISCONTINUED | OUTPATIENT
Start: 2025-02-19 | End: 2025-02-19

## 2025-02-19 RX ORDER — SODIUM CHLORIDE, SODIUM LACTATE, POTASSIUM CHLORIDE, CALCIUM CHLORIDE 600; 310; 30; 20 MG/100ML; MG/100ML; MG/100ML; MG/100ML
INJECTION, SOLUTION INTRAVENOUS CONTINUOUS
Status: DISCONTINUED | OUTPATIENT
Start: 2025-02-19 | End: 2025-02-20

## 2025-02-19 RX ORDER — ACETAMINOPHEN 500 MG
1000 TABLET ORAL EVERY 6 HOURS
Status: DISCONTINUED | OUTPATIENT
Start: 2025-02-19 | End: 2025-02-22 | Stop reason: HOSPADM

## 2025-02-19 RX ORDER — ONDANSETRON 2 MG/ML
4 INJECTION INTRAMUSCULAR; INTRAVENOUS EVERY 30 MIN PRN
Status: DISCONTINUED | OUTPATIENT
Start: 2025-02-19 | End: 2025-02-19 | Stop reason: HOSPADM

## 2025-02-19 RX ORDER — SODIUM CHLORIDE, SODIUM LACTATE, POTASSIUM CHLORIDE, CALCIUM CHLORIDE 600; 310; 30; 20 MG/100ML; MG/100ML; MG/100ML; MG/100ML
INJECTION, SOLUTION INTRAVENOUS CONTINUOUS
Status: DISCONTINUED | OUTPATIENT
Start: 2025-02-19 | End: 2025-02-19 | Stop reason: HOSPADM

## 2025-02-19 RX ORDER — BUPIVACAINE HYDROCHLORIDE AND EPINEPHRINE 5; 5 MG/ML; UG/ML
INJECTION, SOLUTION EPIDURAL; INTRACAUDAL; PERINEURAL
Status: DISCONTINUED
Start: 2025-02-19 | End: 2025-02-19 | Stop reason: HOSPADM

## 2025-02-19 RX ORDER — ONDANSETRON 2 MG/ML
INJECTION INTRAMUSCULAR; INTRAVENOUS PRN
Status: DISCONTINUED | OUTPATIENT
Start: 2025-02-19 | End: 2025-02-19

## 2025-02-19 RX ORDER — POTASSIUM CHLORIDE 1500 MG/1
40 TABLET, EXTENDED RELEASE ORAL ONCE
Status: COMPLETED | OUTPATIENT
Start: 2025-02-19 | End: 2025-02-19

## 2025-02-19 RX ORDER — PROPRANOLOL HYDROCHLORIDE 60 MG/1
60 CAPSULE, EXTENDED RELEASE ORAL DAILY
Status: DISCONTINUED | OUTPATIENT
Start: 2025-02-20 | End: 2025-02-22 | Stop reason: HOSPADM

## 2025-02-19 RX ORDER — RALOXIFENE HYDROCHLORIDE 60 MG/1
60 TABLET, FILM COATED ORAL DAILY
Status: DISCONTINUED | OUTPATIENT
Start: 2025-02-20 | End: 2025-02-22 | Stop reason: HOSPADM

## 2025-02-19 RX ORDER — NALOXONE HYDROCHLORIDE 0.4 MG/ML
0.1 INJECTION, SOLUTION INTRAMUSCULAR; INTRAVENOUS; SUBCUTANEOUS
Status: DISCONTINUED | OUTPATIENT
Start: 2025-02-19 | End: 2025-02-19 | Stop reason: HOSPADM

## 2025-02-19 RX ORDER — PROCHLORPERAZINE MALEATE 5 MG/1
5 TABLET ORAL EVERY 6 HOURS PRN
Status: DISCONTINUED | OUTPATIENT
Start: 2025-02-19 | End: 2025-02-22 | Stop reason: HOSPADM

## 2025-02-19 RX ORDER — LEVOTHYROXINE SODIUM 112 UG/1
112 TABLET ORAL
Status: DISCONTINUED | OUTPATIENT
Start: 2025-02-20 | End: 2025-02-22 | Stop reason: HOSPADM

## 2025-02-19 RX ORDER — POTASSIUM CHLORIDE 7.45 MG/ML
INJECTION INTRAVENOUS PRN
Status: DISCONTINUED | OUTPATIENT
Start: 2025-02-19 | End: 2025-02-19

## 2025-02-19 RX ORDER — MEMANTINE HYDROCHLORIDE 14 MG/1
14 CAPSULE, EXTENDED RELEASE ORAL DAILY
Status: DISCONTINUED | OUTPATIENT
Start: 2025-02-20 | End: 2025-02-22 | Stop reason: HOSPADM

## 2025-02-19 RX ORDER — FENTANYL CITRATE 0.05 MG/ML
50 INJECTION, SOLUTION INTRAMUSCULAR; INTRAVENOUS EVERY 5 MIN PRN
Status: DISCONTINUED | OUTPATIENT
Start: 2025-02-19 | End: 2025-02-19 | Stop reason: HOSPADM

## 2025-02-19 RX ORDER — NALOXONE HYDROCHLORIDE 0.4 MG/ML
0.2 INJECTION, SOLUTION INTRAMUSCULAR; INTRAVENOUS; SUBCUTANEOUS
Status: DISCONTINUED | OUTPATIENT
Start: 2025-02-19 | End: 2025-02-22 | Stop reason: HOSPADM

## 2025-02-19 RX ORDER — MAGNESIUM HYDROXIDE 1200 MG/15ML
LIQUID ORAL PRN
Status: DISCONTINUED | OUTPATIENT
Start: 2025-02-19 | End: 2025-02-19 | Stop reason: HOSPADM

## 2025-02-19 RX ORDER — METRONIDAZOLE 500 MG/100ML
500 INJECTION, SOLUTION INTRAVENOUS EVERY 12 HOURS
Status: COMPLETED | OUTPATIENT
Start: 2025-02-19 | End: 2025-02-20

## 2025-02-19 RX ORDER — PROPOFOL 10 MG/ML
INJECTION, EMULSION INTRAVENOUS PRN
Status: DISCONTINUED | OUTPATIENT
Start: 2025-02-19 | End: 2025-02-19

## 2025-02-19 RX ORDER — ENOXAPARIN SODIUM 100 MG/ML
40 INJECTION SUBCUTANEOUS EVERY 24 HOURS
Status: DISCONTINUED | OUTPATIENT
Start: 2025-02-20 | End: 2025-02-22 | Stop reason: HOSPADM

## 2025-02-19 RX ORDER — FOLIC ACID 1 MG/1
1000 TABLET ORAL DAILY
Status: DISCONTINUED | OUTPATIENT
Start: 2025-02-20 | End: 2025-02-22 | Stop reason: HOSPADM

## 2025-02-19 RX ORDER — FENTANYL CITRATE 0.05 MG/ML
25 INJECTION, SOLUTION INTRAMUSCULAR; INTRAVENOUS EVERY 5 MIN PRN
Status: DISCONTINUED | OUTPATIENT
Start: 2025-02-19 | End: 2025-02-19 | Stop reason: HOSPADM

## 2025-02-19 RX ORDER — ONDANSETRON 2 MG/ML
4 INJECTION INTRAMUSCULAR; INTRAVENOUS ONCE
Status: COMPLETED | OUTPATIENT
Start: 2025-02-19 | End: 2025-02-19

## 2025-02-19 RX ORDER — CEFAZOLIN SODIUM/WATER 2 G/20 ML
2 SYRINGE (ML) INTRAVENOUS SEE ADMIN INSTRUCTIONS
Status: DISCONTINUED | OUTPATIENT
Start: 2025-02-19 | End: 2025-02-19 | Stop reason: HOSPADM

## 2025-02-19 RX ORDER — FENTANYL CITRATE 50 UG/ML
INJECTION, SOLUTION INTRAMUSCULAR; INTRAVENOUS PRN
Status: DISCONTINUED | OUTPATIENT
Start: 2025-02-19 | End: 2025-02-19

## 2025-02-19 RX ORDER — HEPARIN SODIUM 5000 [USP'U]/.5ML
5000 INJECTION, SOLUTION INTRAVENOUS; SUBCUTANEOUS
Status: COMPLETED | OUTPATIENT
Start: 2025-02-19 | End: 2025-02-19

## 2025-02-19 RX ORDER — BUPIVACAINE HYDROCHLORIDE AND EPINEPHRINE 5; 5 MG/ML; UG/ML
INJECTION, SOLUTION PERINEURAL PRN
Status: DISCONTINUED | OUTPATIENT
Start: 2025-02-19 | End: 2025-02-19 | Stop reason: HOSPADM

## 2025-02-19 RX ORDER — CIPROFLOXACIN 2 MG/ML
400 INJECTION, SOLUTION INTRAVENOUS EVERY 12 HOURS
Status: COMPLETED | OUTPATIENT
Start: 2025-02-19 | End: 2025-02-20

## 2025-02-19 RX ORDER — HYDROMORPHONE HCL IN WATER/PF 6 MG/30 ML
0.2 PATIENT CONTROLLED ANALGESIA SYRINGE INTRAVENOUS EVERY 4 HOURS PRN
Status: DISCONTINUED | OUTPATIENT
Start: 2025-02-19 | End: 2025-02-22 | Stop reason: HOSPADM

## 2025-02-19 RX ORDER — ONDANSETRON 2 MG/ML
4 INJECTION INTRAMUSCULAR; INTRAVENOUS EVERY 6 HOURS PRN
Status: DISCONTINUED | OUTPATIENT
Start: 2025-02-19 | End: 2025-02-22 | Stop reason: HOSPADM

## 2025-02-19 RX ORDER — DEXAMETHASONE SODIUM PHOSPHATE 4 MG/ML
4 INJECTION, SOLUTION INTRA-ARTICULAR; INTRALESIONAL; INTRAMUSCULAR; INTRAVENOUS; SOFT TISSUE
Status: DISCONTINUED | OUTPATIENT
Start: 2025-02-19 | End: 2025-02-19 | Stop reason: HOSPADM

## 2025-02-19 RX ORDER — PRIMIDONE 50 MG/1
50 TABLET ORAL AT BEDTIME
Status: DISCONTINUED | OUTPATIENT
Start: 2025-02-19 | End: 2025-02-22 | Stop reason: HOSPADM

## 2025-02-19 RX ORDER — ONDANSETRON 4 MG/1
4 TABLET, ORALLY DISINTEGRATING ORAL EVERY 6 HOURS PRN
Status: DISCONTINUED | OUTPATIENT
Start: 2025-02-19 | End: 2025-02-22 | Stop reason: HOSPADM

## 2025-02-19 RX ORDER — CEFAZOLIN SODIUM/WATER 2 G/20 ML
2 SYRINGE (ML) INTRAVENOUS
Status: COMPLETED | OUTPATIENT
Start: 2025-02-19 | End: 2025-02-19

## 2025-02-19 RX ORDER — HYDROMORPHONE HCL IN WATER/PF 6 MG/30 ML
0.4 PATIENT CONTROLLED ANALGESIA SYRINGE INTRAVENOUS EVERY 5 MIN PRN
Status: DISCONTINUED | OUTPATIENT
Start: 2025-02-19 | End: 2025-02-19 | Stop reason: HOSPADM

## 2025-02-19 RX ORDER — LIDOCAINE HYDROCHLORIDE 20 MG/ML
INJECTION, SOLUTION INFILTRATION; PERINEURAL PRN
Status: DISCONTINUED | OUTPATIENT
Start: 2025-02-19 | End: 2025-02-19

## 2025-02-19 RX ORDER — OXYCODONE HYDROCHLORIDE 5 MG/1
5 TABLET ORAL EVERY 4 HOURS PRN
Status: DISCONTINUED | OUTPATIENT
Start: 2025-02-19 | End: 2025-02-22 | Stop reason: HOSPADM

## 2025-02-19 RX ADMIN — PHENYLEPHRINE HYDROCHLORIDE 0.5 MCG/KG/MIN: 10 INJECTION INTRAVENOUS at 07:41

## 2025-02-19 RX ADMIN — ROCURONIUM BROMIDE 50 MG: 50 INJECTION, SOLUTION INTRAVENOUS at 07:37

## 2025-02-19 RX ADMIN — ESCITALOPRAM OXALATE 20 MG: 20 TABLET, FILM COATED ORAL at 22:05

## 2025-02-19 RX ADMIN — CEFAZOLIN SODIUM 2 G: 2 SOLUTION INTRAVENOUS at 18:20

## 2025-02-19 RX ADMIN — SODIUM CHLORIDE, POTASSIUM CHLORIDE, SODIUM LACTATE AND CALCIUM CHLORIDE: 600; 310; 30; 20 INJECTION, SOLUTION INTRAVENOUS at 06:50

## 2025-02-19 RX ADMIN — PROPOFOL 20 MG: 10 INJECTION, EMULSION INTRAVENOUS at 07:39

## 2025-02-19 RX ADMIN — HYDROMORPHONE HYDROCHLORIDE 0.2 MG: 1 INJECTION, SOLUTION INTRAMUSCULAR; INTRAVENOUS; SUBCUTANEOUS at 09:27

## 2025-02-19 RX ADMIN — PHENYLEPHRINE HYDROCHLORIDE 100 MCG: 10 INJECTION INTRAVENOUS at 07:44

## 2025-02-19 RX ADMIN — SODIUM CHLORIDE, POTASSIUM CHLORIDE, SODIUM LACTATE AND CALCIUM CHLORIDE: 600; 310; 30; 20 INJECTION, SOLUTION INTRAVENOUS at 17:48

## 2025-02-19 RX ADMIN — METRONIDAZOLE 500 MG: 500 INJECTION, SOLUTION INTRAVENOUS at 06:52

## 2025-02-19 RX ADMIN — ROCURONIUM BROMIDE 10 MG: 50 INJECTION, SOLUTION INTRAVENOUS at 09:33

## 2025-02-19 RX ADMIN — PHENYLEPHRINE HYDROCHLORIDE 200 MCG: 10 INJECTION INTRAVENOUS at 07:41

## 2025-02-19 RX ADMIN — HEPARIN SODIUM 5000 UNITS: 10000 INJECTION, SOLUTION INTRAVENOUS; SUBCUTANEOUS at 06:55

## 2025-02-19 RX ADMIN — ONDANSETRON 4 MG: 2 INJECTION, SOLUTION INTRAMUSCULAR; INTRAVENOUS at 07:07

## 2025-02-19 RX ADMIN — POTASSIUM CHLORIDE 10 MEQ: 7.46 INJECTION, SOLUTION INTRAVENOUS at 08:50

## 2025-02-19 RX ADMIN — POTASSIUM CHLORIDE 40 MEQ: 1500 TABLET, EXTENDED RELEASE ORAL at 07:19

## 2025-02-19 RX ADMIN — LIDOCAINE HYDROCHLORIDE 60 MG: 20 INJECTION, SOLUTION INFILTRATION; PERINEURAL at 07:37

## 2025-02-19 RX ADMIN — MIDAZOLAM 0.5 MG: 1 INJECTION INTRAMUSCULAR; INTRAVENOUS at 07:29

## 2025-02-19 RX ADMIN — ALBUMIN (HUMAN): 12.5 SOLUTION INTRAVENOUS at 07:48

## 2025-02-19 RX ADMIN — ALBUMIN (HUMAN): 12.5 SOLUTION INTRAVENOUS at 09:40

## 2025-02-19 RX ADMIN — PROPOFOL 80 MG: 10 INJECTION, EMULSION INTRAVENOUS at 07:37

## 2025-02-19 RX ADMIN — HYDROMORPHONE HYDROCHLORIDE 0.3 MG: 1 INJECTION, SOLUTION INTRAMUSCULAR; INTRAVENOUS; SUBCUTANEOUS at 10:02

## 2025-02-19 RX ADMIN — DEXMEDETOMIDINE HYDROCHLORIDE 4 MCG: 200 INJECTION INTRAVENOUS at 09:27

## 2025-02-19 RX ADMIN — FENTANYL CITRATE 50 MCG: 50 INJECTION INTRAMUSCULAR; INTRAVENOUS at 07:37

## 2025-02-19 RX ADMIN — FENTANYL CITRATE 50 MCG: 50 INJECTION INTRAMUSCULAR; INTRAVENOUS at 08:31

## 2025-02-19 RX ADMIN — CIPROFLOXACIN 400 MG: 2 INJECTION, SOLUTION INTRAVENOUS at 14:06

## 2025-02-19 RX ADMIN — DEXMEDETOMIDINE HYDROCHLORIDE 4 MCG: 200 INJECTION INTRAVENOUS at 09:20

## 2025-02-19 RX ADMIN — DEXMEDETOMIDINE HYDROCHLORIDE 4 MCG: 200 INJECTION INTRAVENOUS at 08:31

## 2025-02-19 RX ADMIN — DONEPEZIL HYDROCHLORIDE 10 MG: 10 TABLET ORAL at 20:27

## 2025-02-19 RX ADMIN — POTASSIUM CHLORIDE 10 MEQ: 7.46 INJECTION, SOLUTION INTRAVENOUS at 07:51

## 2025-02-19 RX ADMIN — ACETAMINOPHEN 975 MG: 325 TABLET, FILM COATED ORAL at 06:40

## 2025-02-19 RX ADMIN — DEXMEDETOMIDINE HYDROCHLORIDE 4 MCG: 200 INJECTION INTRAVENOUS at 09:00

## 2025-02-19 RX ADMIN — Medication 2 G: at 07:37

## 2025-02-19 RX ADMIN — Medication 125 MG: at 10:32

## 2025-02-19 RX ADMIN — PRIMIDONE 50 MG: 50 TABLET ORAL at 22:05

## 2025-02-19 RX ADMIN — METRONIDAZOLE 500 MG: 500 INJECTION, SOLUTION INTRAVENOUS at 20:18

## 2025-02-19 RX ADMIN — DEXMEDETOMIDINE HYDROCHLORIDE 4 MCG: 200 INJECTION INTRAVENOUS at 08:46

## 2025-02-19 RX ADMIN — ROCURONIUM BROMIDE 20 MG: 50 INJECTION, SOLUTION INTRAVENOUS at 08:46

## 2025-02-19 RX ADMIN — PHENYLEPHRINE HYDROCHLORIDE 100 MCG: 10 INJECTION INTRAVENOUS at 08:38

## 2025-02-19 RX ADMIN — ONDANSETRON 4 MG: 2 INJECTION INTRAMUSCULAR; INTRAVENOUS at 10:19

## 2025-02-19 RX ADMIN — PROPOFOL 30 MCG/KG/MIN: 10 INJECTION, EMULSION INTRAVENOUS at 08:23

## 2025-02-19 ASSESSMENT — ACTIVITIES OF DAILY LIVING (ADL)
ADLS_ACUITY_SCORE: 46
ADLS_ACUITY_SCORE: 41
ADLS_ACUITY_SCORE: 46
ADLS_ACUITY_SCORE: 46
ADLS_ACUITY_SCORE: 64
ADLS_ACUITY_SCORE: 41
ADLS_ACUITY_SCORE: 46
ADLS_ACUITY_SCORE: 64
ADLS_ACUITY_SCORE: 46
ADLS_ACUITY_SCORE: 64

## 2025-02-19 ASSESSMENT — ENCOUNTER SYMPTOMS: SEIZURES: 0

## 2025-02-19 ASSESSMENT — COPD QUESTIONNAIRES: COPD: 0

## 2025-02-19 NOTE — OP NOTE
OPERATIVE REPORT      PREOPERATIVE DIAGNOSIS:  Full thickness rectal prolapse.     POSTOPERATIVE DIAGNOSIS:  Hepatic flexure polyp    OPERATION PERFORMED:      Perineal rectosigmoidectomy.     SURGEON:  Grace Roe M.D.    ASSISTANT:  Janet Lombardi PA-C    A skilled first assistant was necessary due to the technical complexity of the procedure and for the patient's safety.  The assistant helped me with positioning, retraction, visualization of the operative field, meticulous wound closure and moreover helped to complete the procedures in a technically safe and efficient manner.      SECOND ASSISTANT: Jose Camargo MD (Colorectal Fellow)    ANESTHESIA:  General.     INDICATIONS:  This is a 69-year-old female with anemia, early-onset dementia who presented to clinic for evaluation of full-thickness rectal prolapse.  Given her significant dementia we did not proceed with full pelvic floor testing.  Instead I recommended a perineal rectosigmoidectomy.On physical examination, she was found to have full-thickness rectal prolapse.  She will now undergo a perineal rectosigmoidectomy for recurrent rectal prolapse .   We also discussed performing a colonoscopy at the time of surgery given her last colonoscopy was greater than 10 years ago and she has this chronic anemia.  After review of the risks and benefits of surgery with the patient, her  and her daughter they all agreed to proceed.    OPERATIVE FINDINGS:    A small polyp was removed from the hepatic flexure.  The patient's rectum was redundant and could easily be prolapsed out of the anus for 5 cm.  The anal sphincter was widely patulous with minimal tone.  Approximately 21 cm of rectum and redundant sigmoid were removed.  The patient's sigmoid colon was redundant with a large sigmoid loop.     PROCEDURE IN DETAIL:  After informed consent was obtained, the patient was brought to the operating room where general anesthesia was induced without difficulty.     She was 1st placed into a left lateral decubitus position.  I then performed a colonoscopy.  Please see the Provation  dictation for full details regarding this portion of the procedure.    She was flipped into the well-padded prone jackknife position with the buttocks taped apart.  A Martinez catheter was inserted.  Her perineum and vagina were sterilely prepped and draped in usual fashion.     The rectum was prolapsed out of the anus using multiple Allis clamps for a length of 5 to 6 cm.  A Lone Star retractor was inserted with sharp hooks in the perianal skin just outside the anoderm.    The rectum was incised at its base 1.5 to 2 cm proximal to the dentate line using needle Bovie electrocautery full-thickness circumferentially.  Four quadrant 2-0 vicryl sutures were placed into the mucosal cuff on the anoderm and tagged with the needles on.  Circumferential dissection was then continued to the outside of the anorectal tube.  The peritoneal cul-de-sac was easily identified and  from the rectum anteriorly.  Careful attention was made not to enter the vagina.  The cul-de-sac was then entered by incising the peritoneum using Bovie electrocautery.  The mesorectum was then retracted superiorly.  The mesorectum was then taken down and divided using the small hand-held LigaSure device working proximally.  A fairly large redundant sigmoid loop was present proximal to the rectum.  The mesorectum and sigmoid colonic mesentery were taken down using the Ligasure device straightening out a large sigmoid loop.  Approximately 21 cm of rectum and sigmoid colon were delivered through the anorectal ring.  The segments of mesorectum and sigmoid colonic mesentery were inspected for hemostasis, which was meticulous.     The Lone Star hooks were replaced into the anoderm cuff in preparation for the anastomosis.  The area of demarcation on the sigmoid colon was clearly identified, and the colon was divided proximally at this  point in an area where the colon was pink and viable with excellent blood supply.  The colon was divided slowly placing the 4-quadrant previously placed 2-0 vicryl sutures into the rectum as it was divided.  The specimen measured 21 cm in length and was removed from the operative field and submitted for pathological analysis.     The anastomosis was then completed using many interrupted 3-0 Vicryl sutures using the 2-0 vicryl 4-quadrant sutures as a guide.  The anastomosis was tension-free with excellent blood supply.  Hemostasis was meticulous.  The Lone Star retractor was removed allowing the anastomosis to retract into the anal canal.    Perianal field block consisting of 30 mL of 0.5% Marcaine with epinephrine was injected into the perianal region for prolonged postoperative analgesia.  A sterile gauze dressing was applied.    The patient tolerated the procedure well without complications.  Estimated blood loss was 5 mL.  I was present and scrubbed for the entire duration of the operation.  The patient was returned to the supine position and extubated in the operating room.  She was brought to the recovery room in stable condition.    SPECIMEN:   Hepatic flexure polyp  Rectum and sigmoid colon    Grace Roe MD

## 2025-02-19 NOTE — BRIEF OP NOTE
"[facility]    Brief Operative Note    Pre-operative diagnosis: Rectal prolapse [K62.3]  Post-operative diagnosis Same as pre-operative diagnosis    Procedure: INTRAOPERATIVE Colonoscopy, Polypectomy, N/A - Rectum  OPEN Perineal Rectosigmoidectomy, N/A - Perineum    Surgeon: Surgeons and Role:     * Nathalie Roe MD - Primary  Anesthesia: General   Estimated Blood Loss: Minimal    Drains: None  Specimens:   ID Type Source Tests Collected by Time Destination   1 : HEPATIC FLEXTURE POLYP Polyp Large Intestine, Colon, Hepatic Flexure SURGICAL PATHOLOGY EXAM Nathalie Roe MD 2/19/2025  8:01 AM    2 : SIGMOID COLON AND RECTUM Tissue Large Intestine, Colon, Sigmoid/Rectal SURGICAL PATHOLOGY EXAM Nathalie Roe MD 2/19/2025 10:05 AM      Findings:   Full thickness rectal prolapse; 21cm of rectum and sigmoid colon removed.  Complications: None.  Implants: * No implants in log *    Condition on discharge from OR: Satisfactory    Janet Lombardi PA-C   [facility]   [phone].        ADDENDUM:    PATIENT DATA  Indicate Y or N:  Home O2 No  Hemodialysis  No  Transplant patient  No  Cirrhosis  No  Steroids in last 30 days  No  Immunomodulators in last 30 days  No  Anticoagulation at time of surgery  No  Prior abdominal surgery  Yes  Pelvic irradiation  No    Albumin within 30 days if known na   Hgb within 30 days if known 9.5   Hemoglobin   Date Value Ref Range Status   02/19/2025 9.5 (L) 11.7 - 15.7 g/dL Final   ]  Cr within 30 days if known na No results found for: \"CR\"]  Body mass index is 25.25 kg/m .      OR DATA  Emergent  No   <24 hours  No   <1 week  No  Bowel Prep Yes  Antibiotics  Yes  DVT prophylaxis    Heparin  Yes   SCD  Yes   None  No  Drain  No  ASA (1,2,3,4) 3  OR time (min) 162  Stents  No  Transfuse >/= 2U  No  Anastomosis   Stapled  No   Handsewn  Yes  Leak Test    Positive  No   Negative  No   Not done  Yes          "

## 2025-02-19 NOTE — ANESTHESIA PROCEDURE NOTES
Airway       Patient location during procedure: OR       Procedure Start/Stop Times: 2/19/2025 7:41 AM  Staff -        CRNA: Brittany Napoles APRN CRNA       Performed By: CRNA  Consent for Airway        Urgency: elective  Indications and Patient Condition       Indications for airway management: reina-procedural       Induction type:intravenous       Mask difficulty assessment: 1 - vent by mask    Final Airway Details       Final airway type: endotracheal airway       Successful airway: ETT - single  Endotracheal Airway Details        ETT size (mm): 6.5       Cuffed: yes       Successful intubation technique: video laryngoscopy       VL Blade Size: Glidescope 3       Grade View of Cords: 1       Adjucts: stylet       Position: Right       Measured from: gums/teeth       Secured at (cm): 20       Bite block used: None    Post intubation assessment        Placement verified by: capnometry, equal breath sounds and chest rise        Number of attempts at approach: 1       Number of other approaches attempted: 0       Secured with: tape       Ease of procedure: easy       Dentition: Unchanged    Medication(s) Administered   Medication Administration Time: 2/19/2025 7:41 AM

## 2025-02-19 NOTE — OR NURSING
Family (daughter Mamie) at bedside with pt. Resps regular and unlabored, pt. Inconsistant with following commands, no facial grimmacing, cont. To monitor.

## 2025-02-19 NOTE — ANESTHESIA PREPROCEDURE EVALUATION
Anesthesia Pre-Procedure Evaluation    Patient: Tena Sy   MRN: [id number] : 1955        Procedure : Procedure(s):  INTRAOPERATIVE Colonoscopy  OPEN Perineal Rectosigmoidectomy          Past Medical History:   Diagnosis Date    Acquired hemolytic anemia (H)     Anxiety     Chest pain     Convulsions (H)     Dementia in Alzheimer's disease (H)     Heartburn     Hemochromatosis     Hereditary spherocytosis     Hypothyroidism     MELIDA (iron deficiency anemia)     Interstitial pulmonary disease (H)     MDD (major depressive disorder)     Osteoarthritis     Osteoporosis     Tremor, hereditary     Type 2 diabetes mellitus with diabetic nephropathy (H)       Past Surgical History:   Procedure Laterality Date    CATARACT EXTRACTION Bilateral     CHOLECYSTECTOMY      COLONOSCOPY  2008    TOTAL ABDOMINAL HYSTERECTOMY AND BILATERAL SALPINGO-OOPHORECTOMY        Allergies   Allergen Reactions    Dexamethasone     Sulfa Antibiotics      *childhood rxn      Social History     Tobacco Use    Smoking status: Never    Smokeless tobacco: Never   Substance Use Topics    Alcohol use: Not Currently      Wt Readings from Last 1 Encounters:   25 56.7 kg (125 lb)        Anesthesia Evaluation            ROS/MED HX  ENT/Pulmonary:    (-) asthma, COPD and sleep apnea   Neurologic: Comment: psuedoseizures    (+)   dementia,                          (-) no seizures and no CVA   Cardiovascular:       METS/Exercise Tolerance:     Hematologic: Comments: Hereditary spherocytosis     (+)      anemia,          Musculoskeletal:   (+)  arthritis,             GI/Hepatic:    (-) GERD and liver disease   Renal/Genitourinary:     (+) renal disease,             Endo:     (+)  type II DM,        thyroid problem, hypothyroidism,           Psychiatric/Substance Use:     (+) psychiatric history anxiety and depression       Infectious Disease:       Malignancy:       Other:            Physical Exam    Airway        Mallampati: III  "  TM distance: > 3 FB   Neck ROM: full     Respiratory Devices and Support         Dental       (+) Modest Abnormalities - crowns, retainers, 1 or 2 missing teeth      Cardiovascular   cardiovascular exam normal          Pulmonary   pulmonary exam normal                OUTSIDE LABS:  CBC:   Lab Results   Component Value Date    HGB 9.5 (L) 02/19/2025     BMP:   Lab Results   Component Value Date    POTASSIUM 2.8 (L) 02/19/2025     (H) 02/19/2025     COAGS: No results found for: \"PTT\", \"INR\", \"FIBR\"  POC: No results found for: \"BGM\", \"HCG\", \"HCGS\"  HEPATIC: No results found for: \"ALBUMIN\", \"PROTTOTAL\", \"ALT\", \"AST\", \"GGT\", \"ALKPHOS\", \"BILITOTAL\", \"BILIDIRECT\", \"ANNETTA\"  OTHER: No results found for: \"PH\", \"LACT\", \"A1C\", \"LISA\", \"PHOS\", \"MAG\", \"LIPASE\", \"AMYLASE\", \"TSH\", \"T4\", \"T3\", \"CRP\", \"SED\"    Anesthesia Plan    ASA Status:  3       Anesthesia Type: General.     - Airway: ETT              Consents    Anesthesia Plan(s) and associated risks, benefits, and realistic alternatives discussed. Questions answered and patient/representative(s) expressed understanding.     - Discussed:     - Discussed with:  Patient            Postoperative Care       PONV prophylaxis: Ondansetron (or other 5HT-3), Background Propofol Infusion     Comments:    Other Comments: Replace potassium           Sandie Skinner    I have reviewed the pertinent notes and labs in the chart from the past 30 days and (re)examined the patient.  Any updates or changes from those notes are reflected in this note.    Clinically Significant Risk Factors Present on Admission        # Hypokalemia: Lowest K = 2.8 mmol/L in last 2 days, will replace as needed                 # Anemia: based on hgb <11       # Overweight: Estimated body mass index is 25.25 kg/m  as calculated from the following:    Height as of this encounter: 1.499 m (4' 11\").    Weight as of this encounter: 56.7 kg (125 lb).                "

## 2025-02-19 NOTE — ANESTHESIA POSTPROCEDURE EVALUATION
Patient: Tena Sy    Procedure: Procedure(s):  INTRAOPERATIVE Colonoscopy, Polypectomy  OPEN Perineal Rectosigmoidectomy       Anesthesia Type:  General    Note:  Disposition: Admission   Postop Pain Control: Uneventful            Sign Out: Well controlled pain   PONV: No   Neuro/Psych: Uneventful            Sign Out: Acceptable/Baseline neuro status   Airway/Respiratory: Uneventful            Sign Out: Acceptable/Baseline resp. status   CV/Hemodynamics: Uneventful            Sign Out: Acceptable CV status   Other NRE:    DID A NON-ROUTINE EVENT OCCUR? No           Last vitals:  Vitals Value Taken Time   /70 02/19/25 1400   Temp 36.4  C (97.6  F) 02/19/25 1330   Pulse 73 02/19/25 1403   Resp 15 02/19/25 1403   SpO2 95 % 02/19/25 1403   Vitals shown include unfiled device data.    Electronically Signed By: Sandie Skinner  February 19, 2025  2:11 PM

## 2025-02-19 NOTE — ANESTHESIA CARE TRANSFER NOTE
Patient: Tena Sy    Procedure: Procedure(s):  INTRAOPERATIVE Colonoscopy, Polypectomy  OPEN Perineal Rectosigmoidectomy       Diagnosis: Rectal prolapse [K62.3]  Diagnosis Additional Information: No value filed.    Anesthesia Type:   General     Note:    Oropharynx: oropharynx clear of all foreign objects and spontaneously breathing  Level of Consciousness: drowsy  Oxygen Supplementation: face mask  Level of Supplemental Oxygen (L/min / FiO2): 6  Independent Airway: airway patency satisfactory and stable  Dentition: dentition unchanged  Vital Signs Stable: post-procedure vital signs reviewed and stable  Report to RN Given: handoff report given  Patient transferred to: PACU    Handoff Report: Identifed the Patient, Identified the Reponsible Provider, Reviewed the pertinent medical history, Discussed the surgical course, Reviewed Intra-OP anesthesia mangement and issues during anesthesia, Set expectations for post-procedure period and Allowed opportunity for questions and acknowledgement of understanding      Vitals:  Vitals Value Taken Time   /61 02/19/25 1050   Temp 36.8C    Pulse 66 02/19/25 1052   Resp 8 02/19/25 1052   SpO2 100 % 02/19/25 1052   Vitals shown include unfiled device data.    Electronically Signed By: LUBA Ware CRNA  February 19, 2025  10:53 AM

## 2025-02-20 ENCOUNTER — APPOINTMENT (OUTPATIENT)
Dept: PHYSICAL THERAPY | Facility: CLINIC | Age: 70
DRG: 348 | End: 2025-02-20
Attending: COLON & RECTAL SURGERY
Payer: COMMERCIAL

## 2025-02-20 VITALS
TEMPERATURE: 98.8 F | SYSTOLIC BLOOD PRESSURE: 114 MMHG | HEIGHT: 59 IN | RESPIRATION RATE: 16 BRPM | BODY MASS INDEX: 25.2 KG/M2 | HEART RATE: 77 BPM | DIASTOLIC BLOOD PRESSURE: 55 MMHG | WEIGHT: 125 LBS | OXYGEN SATURATION: 99 %

## 2025-02-20 LAB
ANION GAP SERPL CALCULATED.3IONS-SCNC: 11 MMOL/L (ref 7–15)
ANION GAP SERPL CALCULATED.3IONS-SCNC: 11 MMOL/L (ref 7–15)
BLD PROD TYP BPU: NORMAL
BLOOD COMPONENT TYPE: NORMAL
BUN SERPL-MCNC: 5.2 MG/DL (ref 8–23)
BUN SERPL-MCNC: 5.4 MG/DL (ref 8–23)
CALCIUM SERPL-MCNC: 8 MG/DL (ref 8.8–10.4)
CALCIUM SERPL-MCNC: 8.1 MG/DL (ref 8.8–10.4)
CHLORIDE SERPL-SCNC: 104 MMOL/L (ref 98–107)
CHLORIDE SERPL-SCNC: 105 MMOL/L (ref 98–107)
CODING SYSTEM: NORMAL
CREAT SERPL-MCNC: 0.77 MG/DL (ref 0.51–0.95)
CREAT SERPL-MCNC: 0.79 MG/DL (ref 0.51–0.95)
CROSSMATCH: NORMAL
EGFRCR SERPLBLD CKD-EPI 2021: 80 ML/MIN/1.73M2
EGFRCR SERPLBLD CKD-EPI 2021: 82 ML/MIN/1.73M2
ERYTHROCYTE [DISTWIDTH] IN BLOOD BY AUTOMATED COUNT: 19.3 % (ref 10–15)
GLUCOSE SERPL-MCNC: 100 MG/DL (ref 70–99)
GLUCOSE SERPL-MCNC: 94 MG/DL (ref 70–99)
HCO3 SERPL-SCNC: 22 MMOL/L (ref 22–29)
HCO3 SERPL-SCNC: 23 MMOL/L (ref 22–29)
HCT VFR BLD AUTO: 18.5 % (ref 35–47)
HGB BLD-MCNC: 6.4 G/DL (ref 11.7–15.7)
HGB BLD-MCNC: 6.6 G/DL (ref 11.7–15.7)
ISSUE DATE AND TIME: NORMAL
MAGNESIUM SERPL-MCNC: 1.6 MG/DL (ref 1.7–2.3)
MCH RBC QN AUTO: 34 PG (ref 26.5–33)
MCHC RBC AUTO-ENTMCNC: 34.6 G/DL (ref 31.5–36.5)
MCV RBC AUTO: 98 FL (ref 78–100)
PATH REPORT.COMMENTS IMP SPEC: NORMAL
PATH REPORT.COMMENTS IMP SPEC: NORMAL
PATH REPORT.FINAL DX SPEC: NORMAL
PATH REPORT.GROSS SPEC: NORMAL
PATH REPORT.MICROSCOPIC SPEC OTHER STN: NORMAL
PATH REPORT.RELEVANT HX SPEC: NORMAL
PHOSPHATE SERPL-MCNC: 2.6 MG/DL (ref 2.5–4.5)
PHOTO IMAGE: NORMAL
PLATELET # BLD AUTO: 189 10E3/UL (ref 150–450)
POTASSIUM SERPL-SCNC: 3.3 MMOL/L (ref 3.4–5.3)
POTASSIUM SERPL-SCNC: 3.3 MMOL/L (ref 3.4–5.3)
RBC # BLD AUTO: 1.88 10E6/UL (ref 3.8–5.2)
SODIUM SERPL-SCNC: 138 MMOL/L (ref 135–145)
SODIUM SERPL-SCNC: 138 MMOL/L (ref 135–145)
UNIT ABO/RH: NORMAL
UNIT NUMBER: NORMAL
UNIT STATUS: NORMAL
UNIT TYPE ISBT: 5100
WBC # BLD AUTO: 5.1 10E3/UL (ref 4–11)

## 2025-02-20 PROCEDURE — 82374 ASSAY BLOOD CARBON DIOXIDE: CPT | Performed by: STUDENT IN AN ORGANIZED HEALTH CARE EDUCATION/TRAINING PROGRAM

## 2025-02-20 PROCEDURE — 36415 COLL VENOUS BLD VENIPUNCTURE: CPT | Performed by: COLON & RECTAL SURGERY

## 2025-02-20 PROCEDURE — 250N000011 HC RX IP 250 OP 636: Performed by: SURGERY

## 2025-02-20 PROCEDURE — 88305 TISSUE EXAM BY PATHOLOGIST: CPT | Mod: 26 | Performed by: PATHOLOGY

## 2025-02-20 PROCEDURE — 82310 ASSAY OF CALCIUM: CPT | Performed by: STUDENT IN AN ORGANIZED HEALTH CARE EDUCATION/TRAINING PROGRAM

## 2025-02-20 PROCEDURE — 84100 ASSAY OF PHOSPHORUS: CPT | Performed by: SURGERY

## 2025-02-20 PROCEDURE — 80048 BASIC METABOLIC PNL TOTAL CA: CPT | Performed by: STUDENT IN AN ORGANIZED HEALTH CARE EDUCATION/TRAINING PROGRAM

## 2025-02-20 PROCEDURE — 85018 HEMOGLOBIN: CPT | Performed by: COLON & RECTAL SURGERY

## 2025-02-20 PROCEDURE — 99222 1ST HOSP IP/OBS MODERATE 55: CPT | Performed by: HOSPITALIST

## 2025-02-20 PROCEDURE — 83735 ASSAY OF MAGNESIUM: CPT | Performed by: SURGERY

## 2025-02-20 PROCEDURE — 84295 ASSAY OF SERUM SODIUM: CPT | Performed by: SURGERY

## 2025-02-20 PROCEDURE — 250N000011 HC RX IP 250 OP 636: Mod: JZ | Performed by: COLON & RECTAL SURGERY

## 2025-02-20 PROCEDURE — 83735 ASSAY OF MAGNESIUM: CPT | Performed by: HOSPITALIST

## 2025-02-20 PROCEDURE — 120N000001 HC R&B MED SURG/OB

## 2025-02-20 PROCEDURE — 85027 COMPLETE CBC AUTOMATED: CPT | Performed by: SURGERY

## 2025-02-20 PROCEDURE — 97530 THERAPEUTIC ACTIVITIES: CPT | Mod: GP | Performed by: PHYSICAL THERAPIST

## 2025-02-20 PROCEDURE — 97116 GAIT TRAINING THERAPY: CPT | Mod: GP | Performed by: PHYSICAL THERAPIST

## 2025-02-20 PROCEDURE — P9016 RBC LEUKOCYTES REDUCED: HCPCS | Performed by: STUDENT IN AN ORGANIZED HEALTH CARE EDUCATION/TRAINING PROGRAM

## 2025-02-20 PROCEDURE — 250N000013 HC RX MED GY IP 250 OP 250 PS 637: Performed by: SURGERY

## 2025-02-20 PROCEDURE — 36415 COLL VENOUS BLD VENIPUNCTURE: CPT | Performed by: SURGERY

## 2025-02-20 PROCEDURE — 97161 PT EVAL LOW COMPLEX 20 MIN: CPT | Mod: GP | Performed by: PHYSICAL THERAPIST

## 2025-02-20 PROCEDURE — 80048 BASIC METABOLIC PNL TOTAL CA: CPT | Performed by: SURGERY

## 2025-02-20 PROCEDURE — 999N000040 HC STATISTIC CONSULT NO CHARGE VASC ACCESS

## 2025-02-20 PROCEDURE — 999N000128 HC STATISTIC PERIPHERAL IV START W/O US GUIDANCE

## 2025-02-20 PROCEDURE — 999N000111 HC STATISTIC OT IP EVAL DEFER

## 2025-02-20 RX ADMIN — MEMANTINE HYDROCHLORIDE 14 MG: 14 CAPSULE, EXTENDED RELEASE ORAL at 08:55

## 2025-02-20 RX ADMIN — ACETAMINOPHEN 1000 MG: 500 TABLET, FILM COATED ORAL at 06:46

## 2025-02-20 RX ADMIN — RALOXIFENE HYDROCHLORIDE 60 MG: 60 TABLET, FILM COATED ORAL at 08:55

## 2025-02-20 RX ADMIN — ENOXAPARIN SODIUM 40 MG: 40 INJECTION SUBCUTANEOUS at 09:01

## 2025-02-20 RX ADMIN — PRIMIDONE 50 MG: 50 TABLET ORAL at 21:20

## 2025-02-20 RX ADMIN — PANTOPRAZOLE SODIUM 40 MG: 40 TABLET, DELAYED RELEASE ORAL at 06:46

## 2025-02-20 RX ADMIN — ACETAMINOPHEN 1000 MG: 500 TABLET, FILM COATED ORAL at 17:54

## 2025-02-20 RX ADMIN — FOLIC ACID 1000 MCG: 1 TABLET ORAL at 08:55

## 2025-02-20 RX ADMIN — METRONIDAZOLE 500 MG: 500 INJECTION, SOLUTION INTRAVENOUS at 06:45

## 2025-02-20 RX ADMIN — DONEPEZIL HYDROCHLORIDE 10 MG: 10 TABLET ORAL at 19:56

## 2025-02-20 RX ADMIN — PROPRANOLOL HYDROCHLORIDE 60 MG: 60 CAPSULE, EXTENDED RELEASE ORAL at 08:55

## 2025-02-20 RX ADMIN — ESCITALOPRAM OXALATE 20 MG: 20 TABLET, FILM COATED ORAL at 21:20

## 2025-02-20 RX ADMIN — PANTOPRAZOLE SODIUM 40 MG: 40 TABLET, DELAYED RELEASE ORAL at 17:54

## 2025-02-20 RX ADMIN — CEFAZOLIN SODIUM 2 G: 2 SOLUTION INTRAVENOUS at 01:50

## 2025-02-20 RX ADMIN — ACETAMINOPHEN 1000 MG: 500 TABLET, FILM COATED ORAL at 00:45

## 2025-02-20 RX ADMIN — CIPROFLOXACIN 400 MG: 2 INJECTION, SOLUTION INTRAVENOUS at 02:39

## 2025-02-20 RX ADMIN — LEVOTHYROXINE SODIUM 112 MCG: 112 TABLET ORAL at 06:46

## 2025-02-20 RX ADMIN — ACETAMINOPHEN 1000 MG: 500 TABLET, FILM COATED ORAL at 12:08

## 2025-02-20 RX ADMIN — CEFAZOLIN SODIUM 2 G: 2 SOLUTION INTRAVENOUS at 09:01

## 2025-02-20 ASSESSMENT — ACTIVITIES OF DAILY LIVING (ADL)
ADLS_ACUITY_SCORE: 65
ADLS_ACUITY_SCORE: 63
ADLS_ACUITY_SCORE: 65
DEPENDENT_IADLS:: CLEANING;COOKING;LAUNDRY;SHOPPING;MEAL PREPARATION
ADLS_ACUITY_SCORE: 65
ADLS_ACUITY_SCORE: 63
ADLS_ACUITY_SCORE: 65
ADLS_ACUITY_SCORE: 63
ADLS_ACUITY_SCORE: 63
ADLS_ACUITY_SCORE: 65
ADLS_ACUITY_SCORE: 63
ADLS_ACUITY_SCORE: 65
ADLS_ACUITY_SCORE: 65
ADLS_ACUITY_SCORE: 63
ADLS_ACUITY_SCORE: 65
ADLS_ACUITY_SCORE: 63
ADLS_ACUITY_SCORE: 63
ADLS_ACUITY_SCORE: 65
ADLS_ACUITY_SCORE: 63
ADLS_ACUITY_SCORE: 63
ADLS_ACUITY_SCORE: 67

## 2025-02-20 NOTE — PLAN OF CARE
Goal Outcome Evaluation:      Plan of Care Reviewed With: patient, spouse    Overall Patient Progress: improvingOverall Patient Progress: improving         Date & Time: 2/19-20/25 [id number]  Surgery/POD#: 0-1 INTRAOPERATIVE Colonoscopy, Polypectomy  OPEN Perineal Rectosigmoidectomy  Behavior & Aggression: Green, very quiet but pleasant and redirectable   Fall Risk: Yes  Orientation: A&Ox1, oriented only to self overnight  ABNL VS/O2: VSS on RA  ABNL Labs: K+ 2.8 in Preop  Pain Management: Denies, scheduled tylenol given  Bowel/Bladder: Jacobson  Drains: PIV infusing at 75ml/hr with intermittent antibiotics  Wounds/incisions: Perineal incision, blanchable redness on buttocks area  Diet: Regular, tolerating  Activity Level: Up with A1 GB  Tests/Procedures:   Anticipated  DC Date: Pending  Significant Information: Colorectal surgery following. Pt keeps stating she needs to pee, forgets she has a jacobson. Also has stool incontinence, but forgets and gets stool on her hands/body. Sit needed overnight due to grabbing at lines and trying to get OOB.  stayed in room overnight.

## 2025-02-20 NOTE — CONSULTS
"[facility]    History and Physical  Hospitalist       Date of Admission:  2/19/2025    Assessment & Plan   Tena Sy is a 69 year old female with a known history of dementia who underwent open perineal rectosigmoidectomy for full-thickness rectal prolapse.  Hospitalist service consulted for medical comanagement.    #Rectal prolapse s/p open perineal rectosigmoidectomy  -Postop management per colorectal surgery.    #Underlying dementia Alzheimer's  -Continue Aricept 10 mg daily, Namenda 14 mg daily  -Propranolol 60 mg daily    #Iron deficiency anemia  Acute postoperative blood loss anemia  Hereditary spherocytosis   -Hemoglobin did trend down to 6.6 this morning.  1 unit packed RBCs ordered.    #Hypokalemia-on replacement protocol.      #Benign tremor-on propranolol And primidone    #Anxiety with depression  -On Remeron for sleep    #Type 2 diabetes mellitus-currently diet managed.      #Hypothyroidism-continue home levothyroxine      Clinically Significant Risk Factors        # Hypokalemia: Lowest K = 2.8 mmol/L in last 2 days, will replace as needed      # Hypomagnesemia: Lowest Mg = 1.6 mg/dL in last 2 days, will replace as needed                  # Overweight: Estimated body mass index is 25.25 kg/m  as calculated from the following:    Height as of this encounter: 1.499 m (4' 11\").    Weight as of this encounter: 56.7 kg (125 lb)., PRESENT ON ADMISSION             DVT Prophylaxis: Pneumatic Compression Devices  Code Status: Full Code  Medically Ready for Discharge: Anticipated Tomorrow        Susan Lopez MD, MD  [phone] (p)  [phone] (c)    Primary Care Physician   Isra Bro    Chief Complaint       History is obtained from the patient and review of medical records.    History of Present Illness   Tena Sy is a 69 year old female with a known history of dementia who underwent open perineal rectosigmoidectomy for full-thickness rectal prolapse.  Hospitalist " service consulted for medical comanagement.    Medical history includes hereditary sputum cytosis, acquired hemolytic anemia, Alzheimer's dementia, hypothyroidism, depression with anxiety    Past Medical History    I have reviewed this patient's medical history and updated it with pertinent information if needed.   Past Medical History:   Diagnosis Date    Acquired hemolytic anemia (H)     Anxiety     Chest pain     Convulsions (H)     Dementia in Alzheimer's disease (H)     Heartburn     Hemochromatosis     Hereditary spherocytosis     Hypothyroidism     MELIDA (iron deficiency anemia)     Interstitial pulmonary disease (H)     MDD (major depressive disorder)     Osteoarthritis     Osteoporosis     Tremor, hereditary     Type 2 diabetes mellitus with diabetic nephropathy (H)      Past Surgical History   I have reviewed this patient's surgical history and updated it with pertinent information if needed.  Past Surgical History:   Procedure Laterality Date    CATARACT EXTRACTION Bilateral     CHOLECYSTECTOMY  1999    COLONOSCOPY  08/22/2008    COLONOSCOPY N/A 2/19/2025    Procedure: INTRAOPERATIVE Colonoscopy, Polypectomy;  Surgeon: Nathalie Roe MD;  Location: [facility] OR    RECTOSIGMOIDECTOMY PERINEAL N/A 2/19/2025    Procedure: OPEN Perineal Rectosigmoidectomy;  Surgeon: Nathalie Roe MD;  Location: [facility] OR    TOTAL ABDOMINAL HYSTERECTOMY AND BILATERAL SALPINGO-OOPHORECTOMY  1996     Prior to Admission Medications   Prior to Admission Medications   Prescriptions Last Dose Informant Patient Reported? Taking?   LORazepam (ATIVAN) 0.5 MG tablet   Yes Yes   Sig: Take 0.5 mg by mouth daily as needed for anxiety.   calcium citrate 250 MG TABS   Yes Yes   Sig: Take 2 tablets by mouth 2 times daily (with meals).   donepezil (ARICEPT) 10 MG tablet Evening  Yes Yes   Sig: Take 10 mg by mouth daily.   escitalopram (LEXAPRO) 20 MG tablet Bedtime  Yes Yes   Sig: Take 20 mg by mouth at bedtime.   fish oil-omega-3 fatty  acids (OMEGA-3 FISH OIL) 1000 MG capsule   Yes Yes   Sig: Take 1 g by mouth daily.   folic acid 800 MCG tablet   Yes Yes   Sig: Take 800 mcg by mouth daily.   levothyroxine (SYNTHROID/LEVOTHROID) 112 MCG tablet Morning  Yes Yes   Sig: Take 112 mcg by mouth every morning (before breakfast).   memantine XR (NAMENDA XR) 14 MG 24 hr capsule   Yes Yes   Sig: Take 14 mg by mouth daily.   mirtazapine (REMERON) 15 MG tablet   Yes Yes   Sig: Take 15 mg by mouth at bedtime.   multivitamin w/minerals (MULTI-VITAMIN) tablet   Yes Yes   Sig: Take 1 tablet by mouth daily.   omeprazole (PRILOSEC) 20 MG DR capsule   Yes Yes   Sig: Take 20 mg by mouth 2 times daily (before meals).   primidone (MYSOLINE) 50 MG tablet Bedtime  Yes Yes   Sig: Take 50 mg by mouth at bedtime.   propranolol ER (INDERAL LA) 60 MG 24 hr capsule Morning  Yes Yes   Sig: Take 60 mg by mouth daily.   raloxifene (EVISTA) 60 MG tablet Morning  Yes Yes   Sig: Take 60 mg by mouth daily.   vitamin B-12 (CYANOCOBALAMIN) 1000 MCG tablet   Yes Yes   Sig: Take 1,000 mcg by mouth daily.      Facility-Administered Medications: None     Allergies   Allergies   Allergen Reactions    Dexamethasone     Sulfa Antibiotics      *childhood rxn     Social History   I have reviewed this patient's social history and updated it with pertinent information if needed.   Tena  reports that she has never smoked. She has never used smokeless tobacco. She reports that she does not currently use alcohol. She reports that she does not use drugs. She     Family History   I have reviewed this patient's family history and updated it with pertinent information if needed.    No data available          Review of Systems   The 10 point Review of Systems is negative other than noted in the HPI or here.     Physical Exam   Temp: 98.3  F (36.8  C) Temp src: Oral BP: 117/52 Pulse: 73   Resp: 16 SpO2: 100 % O2 Device: None (Room air)    Vital Signs with Ranges  Temp:  [97.6  F (36.4  C)-99.9  F (37.7   C)] 98.3  F (36.8  C)  Pulse:  [69-89] 73  Resp:  [15-18] 16  BP: (100-121)/(51-84) 117/52  SpO2:  [96 %-100 %] 100 %  125 lbs 0 oz    Physical Exam  Constitutional:       Appearance: Normal appearance.   Cardiovascular:      Rate and Rhythm: Normal rate and regular rhythm.      Pulses: Normal pulses.      Heart sounds: Normal heart sounds.   Pulmonary:      Effort: Pulmonary effort is normal. No respiratory distress.      Breath sounds: Normal breath sounds.   Abdominal:      General: Abdomen is flat. Bowel sounds are normal. There is no distension.      Tenderness: There is no abdominal tenderness. There is no guarding.   Skin:     General: Skin is warm and dry.   Neurological:      General: No focal deficit present.         Data   Data reviewed today:  I personally reviewed no images or EKG's today.  Recent Labs   Lab 02/20/25  0926 02/20/25  0706 02/19/25 2007 02/19/25  0616   WBC  --  5.1  --   --    HGB 6.6* 6.4*  --  9.5*   MCV  --  98  --   --    PLT  --  189 233  --     138  --   --    POTASSIUM 3.3* 3.3*  --  2.8*   CHLORIDE 104 105  --   --    CO2 23 22  --   --    BUN 5.2* 5.4*  --   --    CR 0.79 0.77 0.90  --    ANIONGAP 11 11  --   --    LISA 8.1* 8.0*  --   --    * 94  --  167*       No results found for this or any previous visit (from the past 24 hours).

## 2025-02-20 NOTE — PLAN OF CARE
Date & Time: 2/19/25 8511-4987  Surgery/POD#: 0 INTRAOPERATIVE Colonoscopy, Polypectomy  OPEN Perineal Rectosigmoidectomy  Behavior & Aggression: Green  Fall Risk: Yes  Orientation:A&Ox2, disoriented to time and situation  ABNL VS/O2:VSS on RA  ABNL Labs: K+ 2.8 in Preop  Pain Management:Denies  Bowel/Bladder: Martinez  Drains: PIV infusing at 75ml/hr  Wounds/incisions: Perineal incision  Diet:Reg  Activity Level: Up with 1gb   Tests/Procedures:   Anticipated  DC Date: Pending  Significant Information: Colorectal surgery following

## 2025-02-20 NOTE — PLAN OF CARE
OT: Order received, chart reviewed and discussed with care team. Per PT, patient with baseline dementia and is moving with SBA and has assist from spouse for 24/7 to help with I/ADL's. Defer discharge recommendations to PT and care team. Will complete OT orders.

## 2025-02-20 NOTE — PROGRESS NOTES
COLON & RECTAL SURGERY  PROGRESS NOTE    02/20/2025  Post-op Day # 1    SUBJECTIVE:  Feeling well, no acute issues overnight. Tolerating some liquids without nausea. Having bowel movements, denies any prolapse. No abdominal pain.    OBJECTIVE:  Temp:  [97.5  F (36.4  C)-99.9  F (37.7  C)] 98.5  F (36.9  C)  Pulse:  [69-89] 79  Resp:  [16-18] 16  BP: (100-119)/(51-92) 106/52  SpO2:  [96 %-100 %] 98 %    Intake/Output Summary (Last 24 hours) at 2/20/2025 0736  Last data filed at 2/20/2025 0648  Gross per 24 hour   Intake 2594.57 ml   Output 1090 ml   Net 1504.57 ml       GENERAL:  Awake, alert, no acute distress  HEAD: Normocephalic atraumatic  SCLERA: Anicteric  EXTREMITIES: Warm and well perfused  ABDOMEN:  Soft, non-distended, non-tender.    LABS:  AM labs pending    ASSESSMENT/PLAN: 69F with dementia POD#1 s/p Altemeier for full thickness rectal prolapse, recovering appropriately.    1. Regular diet  2. PO pain control  3. Stop IVF  4. Remove Martinez  5. Lovenox for ppx  6. OOB, ambulate  7. PT/OT    Discussed with Dr. Roe.    For questions/paging, please contact the CRS office at [phone].    Sebas Reid MD, MS  Fellow, Colon & Rectal Surgery  02/20/2025  7:36 AM

## 2025-02-20 NOTE — CONSULTS
Care Management Initial Consult    General Information  Assessment completed with: Patient, Family, Mamie  Type of CM/SW Visit: Initial Assessment    Primary Care Provider verified and updated as needed: Yes   Readmission within the last 30 days: no previous admission in last 30 days      Reason for Consult: end of life/hospice, discharge planning  Advance Care Planning:            Communication Assessment  Patient's communication style: spoken language (English or Bilingual)    Hearing Difficulty or Deaf: no   Wear Glasses or Blind: no    Cognitive  Cognitive/Neuro/Behavioral: .WDL except  Level of Consciousness: confused  Arousal Level: opens eyes spontaneously  Orientation: disoriented to, time, situation, place  Mood/Behavior: calm, cooperative  Best Language: 0 - No aphasia  Speech: other (see comments) (quiet)    Living Environment:   People in home: spouse, child(lisset), adult   (Carlos Abdelrahman Mamie)  Current living Arrangements:        Able to return to prior arrangements: yes       Family/Social Support:  Care provided by: spouse/significant other, child(lisset)  Provides care for: no one  Marital Status:   Support system: Children,   Carlos       Description of Support System: Supportive, Involved    Support Assessment: Adequate family and caregiver support    Current Resources:   Patient receiving home care services: No        Community Resources: Hospice  Equipment currently used at home: walker, rolling, wheelchair, manual  Supplies currently used at home: Wound Care Supplies    Employment/Financial:  Employment Status: retired        Financial Concerns: none   Referral to Financial Worker: No       Does the patient's insurance plan have a 3 day qualifying hospital stay waiver?  No    Lifestyle & Psychosocial Needs:  Social Drivers of Health     Food Insecurity: No Food Insecurity (1/21/2025)    [facility] [employer] [facility]    Food Insecurity     Do you worry your food  will run out before you are able to buy more?: 1   Depression: Not at risk (9/25/2024)    Received from [employer] [facility] includes the [facility]    PHQ-2     PHQ-2 TOTAL SCORE: 2   Housing Stability: Low Risk  (1/21/2025)    [facility]    Housing Stability     What is your housing situation today?: 1   Tobacco Use: Low Risk  (2/19/2025)    Patient History     Smoking Tobacco Use: Never     Smokeless Tobacco Use: Never     Passive Exposure: Not on file   Financial Resource Strain: Low Risk  (1/21/2025)    Received from [employer] Count includes the [facility]    Financial Resource Strain     Difficulty of Paying Living Expenses: 3     Difficulty of Paying Living Expenses: Not on file   Alcohol Use: Not on file   Transportation Needs: No Transportation Needs (1/21/2025)    [facility]    Transportation Needs     Does lack of transportation keep you from medical appointments?: 1     Does lack of transportation keep you from work, meetings or getting things that you need?: 1   Physical Activity: Not on file   Interpersonal Safety: Unknown (2/19/2025)    Interpersonal Safety     Do you feel physically and emotionally safe where you currently live?: Patient unable to answer     Within the past 12 months, have you been hit, slapped, kicked or otherwise physically hurt by someone?: Patient unable to answer     Within the past 12 months, have you been humiliated or emotionally abused in other ways by your partner or ex-partner?: Patient unable to answer   Stress: Not on file   Social Connections: Socially Integrated (1/21/2025)    Received from [employer] Count includes the [facility]    Social Connections     Do you often feel lonely or isolated from those around you?: 0   Health Literacy: Not on file       Functional Status:  Prior to admission patient needed assistance:   Dependent ADLs:: Wheelchair-with assist,  Ambulation-walker, Bathing, Dressing  Dependent IADLs:: Cleaning, Cooking, Laundry, Shopping, Meal Preparation       Mental Health Status:          Chemical Dependency Status:                Values/Beliefs:  Spiritual, Cultural Beliefs, Faith Practices, Values that affect care:                 Discussed  Partnership in Safe Discharge Planning  document with patient/family: Yes:    Additional Information:  Consult for discharge planning.     69-year-old female with anemia, early-onset dementia who presented to clinic for evaluation of full-thickness rectal prolapse.     Writer reviewed chart and recommendations at discharge. Writer met with patient at bedside and introduced self and role. Writer confirmed patient's primary doctor and home address as accurate. Patient resides with spouse. Daughter Mamie is a nurse and the best contact for the family. Patient was about to sign on to Hospice with [facility], however, elected to have this procedure first. They would now like [facility] to receive the information upon discharge to sign on with Hospice services, daughter would also like to be sure that she is aware of any wound care needs prior to patient discharging.     Patient does not need any supplies or equipment other then a new walker if possible.     Writer sent a referral to [facility] who responded that they can accept this patient and to let them know when patient is discharging       Next Steps: Care coordination to follow.     RILEY Honeycutt

## 2025-02-20 NOTE — PROGRESS NOTES
"   02/20/25 0900   Appointment Info   Signing Clinician's Name / Credentials (PT) Loyd Mauro DPT       Present no   Living Environment   People in Home spouse   Current Living Arrangements house   Home Accessibility stairs to enter home   Number of Stairs, Main Entrance 3   Stair Railings, Main Entrance railings on both sides of stairs   Transportation Anticipated family or friend will provide   Living Environment Comments Pt lives in a house with her spouse. Stairs to enter. Pt reports her spouse will pick her up upon discharge and provide assist as needed.   Self-Care   Usual Activity Tolerance good   Current Activity Tolerance good   Regular Exercise No   Equipment Currently Used at Home walker, rolling;wheelchair, manual   Fall history within last six months no   Activity/Exercise/Self-Care Comment Pt receives assist with ADLs at baseline. Pt ambulates w/o an AD but has a FWW if needed. Pt does not drive.   General Information   Onset of Illness/Injury or Date of Surgery 02/20/25   Referring Physician Nathalie Roe MD   Patient/Family Therapy Goals Statement (PT) \"To go home\"   Pertinent History of Current Problem (include personal factors and/or comorbidities that impact the POC) Per Chart: s/p NTRAOPERATIVE Colonoscopy, Polypectomy  OPEN Perineal Rectosigmoidectomy  POD#1   Existing Precautions/Restrictions fall   Weight-Bearing Status - LLE full weight-bearing   Weight-Bearing Status - RLE full weight-bearing   Cognition   Orientation Status (Cognition) oriented x 3   Pain Assessment   Patient Currently in Pain No   Posture    Posture Forward head position;Protracted shoulders   Range of Motion (ROM)   Range of Motion ROM is WFL   Strength (Manual Muscle Testing)   Strength (Manual Muscle Testing) Able to perform R SLR;Able to perform L SLR   Bed Mobility   Comment, (Bed Mobility) Supine>sit w/ SBA   Transfers   Comment, (Transfers) Sit>Stand w/ FWW and SBA   Gait/Stairs " (Locomotion)   [place] Level (Gait) supervision   Assistive Device (Gait) walker, front-wheeled   Distance in Feet (Gait) 5'   Balance   Balance Comments Adequate static sitting balance; pt ambulates w/ a FWW   Sensory Examination   Sensory Perception patient reports no sensory changes   Clinical Impression   Criteria for Skilled Therapeutic Intervention Yes, treatment indicated   PT Diagnosis (PT) Impaired gait   Influenced by the following impairments Decreased activity tolerance; decreased balance   Functional limitations due to impairments Impaired functional mobility   Clinical Presentation (PT Evaluation Complexity) stable   Clinical Presentation Rationale Clinical judgement   Clinical Decision Making (Complexity) low complexity   Planned Therapy Interventions (PT) balance training;bed mobility training;gait training;patient/family education;stair training;strengthening;transfer training;progressive activity/exercise   Risk & Benefits of therapy have been explained evaluation/treatment results reviewed;risks/benefits reviewed;care plan/treatment goals reviewed;current/potential barriers reviewed;participants voiced agreement with care plan;participants included;patient   PT Total Evaluation Time   PT Eval, Low Complexity Minutes (28741) 10   Physical Therapy Goals   PT Frequency One time eval and treatment only   PT Predicted Duration/Target Date for Goal Attainment 02/23/25   PT Goals Bed Mobility;Transfers;Gait;Stairs   PT: Bed Mobility Supervision/stand-by assist;Supine to/from sit;Goal Met   PT: Transfers Supervision/stand-by assist;Sit to/from stand;Assistive device;Goal Met   PT: Gait Supervision/stand-by assist;Assistive device;100 feet;Goal Met   PT: Stairs Supervision/stand-by assist;3 stairs;Rail on both sides   Interventions   Interventions Quick Adds Gait Training;Therapeutic Activity   Therapeutic Activity   Therapeutic Activities: dynamic activities to improve functional performance Minutes  (43151) 10   Symptoms Noted During/After Treatment None   Treatment Detail/Skilled Intervention Gree[place] pt supine in bed, agreed to PT. VSS on RA throughout session. Pt alert to self, spouse reporting this is baseline. Pt performed supine>sit w/ SBA. Once in sitting, pt able to scoot self to EOB and sit unsupported without LOB. Pt performed sit>stand x 5 w/ FWW and SBA, cues for hand placement. After ambulation, pt returned to supine in bed w/ SBA, demonstrating ability to safely lift BLEs back into bed and reposition self. Pt ended session supine in bed, with all needs met and call light within reach.   Gait Training   Gait Training Minutes (03741) 15   Symptoms Noted During/After Treatment (Gait Training) none   Treatment Detail/Skilled Intervention Pt ambulated w/ FWW and SBA. Pt ambulated with decreased gait speed, downward gaze, decreased step length. Verbal cues for upright gaze and posture, to increase step length, and pacing. Repeated cues required throughout. Pt needed additional cueing for path navigation. Pt negotiated 4 stairs continuously using bilat rails and SBA. PT provided visual demo for safe stair negotiation. Pt negotiated with a step-to pattern. No LOB noted and pt tolerated activity well.   Distance in Feet 150'   PT Discharge Planning   PT Plan DC   PT Discharge Recommendation (DC Rec) home with assist   PT Rationale for DC Rec Pt appears near baseline. Pt demonstrates safe and effective techniques with all functional mobility. Anticipate at time of discharge pt will be able to safely return home with assist from spouse. Recommend pt use a FWW at discharge.   PT Brief overview of current status Goals of therapy will be to address safe mobility and make recs for d/c to next level of care. Pt and RN will continue to follow all falls risk precautions as documented by RN staff while hospitalized.   PT Total Distance Amb During Session (feet) 125   Physical Therapy Time and Intention   Timed Code  Treatment Minutes 25   Total Session Time (sum of timed and untimed services) 35       Physical Therapy Discharge Summary    Reason for therapy discharge:    All goals and outcomes met, no further needs identified.    Progress towards therapy goal(s). [facility] for goal details.  Goals met    Therapy recommendation(s):    Recommend pt continue to ambulate with nursing staff using a FWW throughout remainder of hospital stay. IPPT goals met.

## 2025-02-21 LAB
ANION GAP SERPL CALCULATED.3IONS-SCNC: 9 MMOL/L (ref 7–15)
BUN SERPL-MCNC: 5.6 MG/DL (ref 8–23)
CALCIUM SERPL-MCNC: 8.4 MG/DL (ref 8.8–10.4)
CHLORIDE SERPL-SCNC: 107 MMOL/L (ref 98–107)
CREAT SERPL-MCNC: 0.65 MG/DL (ref 0.51–0.95)
EGFRCR SERPLBLD CKD-EPI 2021: >90 ML/MIN/1.73M2
GLUCOSE BLDC GLUCOMTR-MCNC: 79 MG/DL (ref 70–99)
GLUCOSE BLDC GLUCOMTR-MCNC: 98 MG/DL (ref 70–99)
GLUCOSE SERPL-MCNC: 102 MG/DL (ref 70–99)
HCO3 SERPL-SCNC: 25 MMOL/L (ref 22–29)
HGB BLD-MCNC: 8 G/DL (ref 11.7–15.7)
MAGNESIUM SERPL-MCNC: 1.6 MG/DL (ref 1.7–2.3)
MAGNESIUM SERPL-MCNC: 1.6 MG/DL (ref 1.7–2.3)
POTASSIUM SERPL-SCNC: 3.2 MMOL/L (ref 3.4–5.3)
POTASSIUM SERPL-SCNC: 3.6 MMOL/L (ref 3.4–5.3)
SODIUM SERPL-SCNC: 141 MMOL/L (ref 135–145)

## 2025-02-21 PROCEDURE — 99232 SBSQ HOSP IP/OBS MODERATE 35: CPT | Performed by: HOSPITALIST

## 2025-02-21 PROCEDURE — 85018 HEMOGLOBIN: CPT | Performed by: HOSPITALIST

## 2025-02-21 PROCEDURE — 80048 BASIC METABOLIC PNL TOTAL CA: CPT | Performed by: HOSPITALIST

## 2025-02-21 PROCEDURE — 120N000001 HC R&B MED SURG/OB

## 2025-02-21 PROCEDURE — 250N000013 HC RX MED GY IP 250 OP 250 PS 637: Performed by: HOSPITALIST

## 2025-02-21 PROCEDURE — 84132 ASSAY OF SERUM POTASSIUM: CPT | Performed by: HOSPITALIST

## 2025-02-21 PROCEDURE — 83735 ASSAY OF MAGNESIUM: CPT | Performed by: HOSPITALIST

## 2025-02-21 PROCEDURE — 250N000013 HC RX MED GY IP 250 OP 250 PS 637: Performed by: SURGERY

## 2025-02-21 PROCEDURE — 36415 COLL VENOUS BLD VENIPUNCTURE: CPT | Performed by: HOSPITALIST

## 2025-02-21 PROCEDURE — 82565 ASSAY OF CREATININE: CPT | Performed by: HOSPITALIST

## 2025-02-21 RX ORDER — POTASSIUM CHLORIDE 1500 MG/1
40 TABLET, EXTENDED RELEASE ORAL ONCE
Status: COMPLETED | OUTPATIENT
Start: 2025-02-21 | End: 2025-02-21

## 2025-02-21 RX ADMIN — ESCITALOPRAM OXALATE 20 MG: 20 TABLET, FILM COATED ORAL at 21:38

## 2025-02-21 RX ADMIN — MEMANTINE HYDROCHLORIDE 14 MG: 14 CAPSULE, EXTENDED RELEASE ORAL at 09:05

## 2025-02-21 RX ADMIN — ACETAMINOPHEN 1000 MG: 500 TABLET, FILM COATED ORAL at 17:11

## 2025-02-21 RX ADMIN — PROPRANOLOL HYDROCHLORIDE 60 MG: 60 CAPSULE, EXTENDED RELEASE ORAL at 09:04

## 2025-02-21 RX ADMIN — LEVOTHYROXINE SODIUM 112 MCG: 112 TABLET ORAL at 06:45

## 2025-02-21 RX ADMIN — FOLIC ACID 1000 MCG: 1 TABLET ORAL at 09:04

## 2025-02-21 RX ADMIN — ACETAMINOPHEN 1000 MG: 500 TABLET, FILM COATED ORAL at 11:07

## 2025-02-21 RX ADMIN — PANTOPRAZOLE SODIUM 40 MG: 40 TABLET, DELAYED RELEASE ORAL at 06:45

## 2025-02-21 RX ADMIN — ACETAMINOPHEN 1000 MG: 500 TABLET, FILM COATED ORAL at 06:45

## 2025-02-21 RX ADMIN — POTASSIUM CHLORIDE 40 MEQ: 1500 TABLET, EXTENDED RELEASE ORAL at 11:07

## 2025-02-21 RX ADMIN — DONEPEZIL HYDROCHLORIDE 10 MG: 10 TABLET ORAL at 21:38

## 2025-02-21 RX ADMIN — RALOXIFENE HYDROCHLORIDE 60 MG: 60 TABLET, FILM COATED ORAL at 09:04

## 2025-02-21 RX ADMIN — PANTOPRAZOLE SODIUM 40 MG: 40 TABLET, DELAYED RELEASE ORAL at 17:11

## 2025-02-21 RX ADMIN — PRIMIDONE 50 MG: 50 TABLET ORAL at 21:38

## 2025-02-21 ASSESSMENT — ACTIVITIES OF DAILY LIVING (ADL)
ADLS_ACUITY_SCORE: 65
ADLS_ACUITY_SCORE: 65
ADLS_ACUITY_SCORE: 69
ADLS_ACUITY_SCORE: 65
ADLS_ACUITY_SCORE: 71
ADLS_ACUITY_SCORE: 71
ADLS_ACUITY_SCORE: 65
ADLS_ACUITY_SCORE: 71
ADLS_ACUITY_SCORE: 65
ADLS_ACUITY_SCORE: 71
ADLS_ACUITY_SCORE: 71
ADLS_ACUITY_SCORE: 65
ADLS_ACUITY_SCORE: 71
ADLS_ACUITY_SCORE: 65
ADLS_ACUITY_SCORE: 73
ADLS_ACUITY_SCORE: 71
ADLS_ACUITY_SCORE: 71
ADLS_ACUITY_SCORE: 65
ADLS_ACUITY_SCORE: 71

## 2025-02-21 NOTE — PROGRESS NOTES
COLON & RECTAL SURGERY  PROGRESS NOTE    February 21, 2025  Post-op Day # 2    SUBJECTIVE:  Denies pain. Frequent diarrhea overnight. Tolerating diet. Family at bedside. AVSS. Hgb 8.0 after getting 1U PRBC yesterday. K+ 3.2.     OBJECTIVE:  Temp:  [98.3  F (36.8  C)-98.8  F (37.1  C)] 98.6  F (37  C)  Pulse:  [65-77] 69  Resp:  [16] 16  BP: ()/(52-66) 128/66  SpO2:  [99 %-100 %] 100 %    Intake/Output Summary (Last 24 hours) at 2/21/2025 1120  Last data filed at 2/21/2025 0945  Gross per 24 hour   Intake 1020 ml   Output 475 ml   Net 545 ml       GENERAL:  Awake, alert, no acute distress  HEAD: Normocephalic atraumatic  SCLERA: Anicteric  EXTREMITIES: Warm and well perfused  ABDOMEN:  Soft, Non-tender, non-distended. No guarding, rigidity, or peritoneal signs.   Rectal: Chaperone present. Perianal skin intact but erythematous. No prolapse.    LABS:  Lab Results   Component Value Date    WBC 5.1 02/20/2025     Lab Results   Component Value Date    HGB 8.0 02/21/2025     Lab Results   Component Value Date    HCT 18.5 02/20/2025     Lab Results   Component Value Date     02/20/2025     Last Basic Metabolic Panel:  Lab Results   Component Value Date     02/21/2025      Lab Results   Component Value Date    POTASSIUM 3.2 02/21/2025     Lab Results   Component Value Date    CHLORIDE 107 02/21/2025     Lab Results   Component Value Date    LISA 8.4 02/21/2025     Lab Results   Component Value Date    CO2 25 02/21/2025     Lab Results   Component Value Date    BUN 5.6 02/21/2025     Lab Results   Component Value Date    CR 0.65 02/21/2025     Lab Results   Component Value Date    GLC 79 02/21/2025     02/21/2025       ASSESSMENT/PLAN: 69F with dementia POD#2 s/p Altemeier for full thickness rectal prolapse, recovering appropriately.     - Regular diet  - PRN pain meds  - OOB, ambulate  - PT/OT   - Barrier cream ordered to protect perianal skin with diarrhea  - Will restart Lovenox for ppx  -  Appreciate hospitalist assistance with cares  - Likely discharge tomorrow    Discussed with Dr. Roe.    For questions/paging, please contact the CRS office at [phone].    Gregory Boykin PA-C  Colorectal Physician Assistant    [facility]  [address]  [place] [address]  T: [phone]  F: [phone]

## 2025-02-21 NOTE — PROGRESS NOTES
[facility]  Hospitalist Progress Note        Earl Enciso MD   02/21/2025        Interval History:        - Patient oriented to self only; family present in room; denies any active complaints but per nursing has had several loose stools overnight, family reports that has been present PTA-- given recent surgery will defer PRN Imodium to colorectal surgery  - transfused one unit UT BC 2/20, Hb 6.6--->8.0  - K improving 2.8--> 3.2; will continue with potassium replacement protocol; Mg 1.6: will also add magnesium replacement protocol  -  following for disposition; apparently was planned to sign on to hospice but wanted to have the procedure first; now plan to sign on with hospice upon discharge         Assessment and Plan:        Tena Sy is a 69 year old female with dementia who underwent open perineal rectosigmoidectomy for full-thickness rectal prolapse by colorectal surgery (2/19/25).  Hospitalist service consulted for medical comanagement.     #Rectal prolapse s/p open perineal rectosigmoidectomy (2/19/25)  likely acute postoperative blood loss anemia with chronic iron deficiency anemia  Hereditary spherocytosis   - Postop management per colorectal surgery  - has been having some loose stools (present on admission per family)-- given recent surgery will defer PRN Imodium to colorectal surgery  - transfused one unit UT BC 2/20 for post op Hb 6.6--->8.0    Hypokalemia  Hypomagnesemia  - K improving 2.8--> 3.2; will continue with potassium replacement protocol; Mg 1.6: will also add magnesium replacement protocol     #Underlying dementia Alzheimer's  -Continue Aricept 10 mg daily, Namenda 14 mg daily  -Propranolol 60 mg daily  -  following for disposition; apparently was planned to sign on to hospice but wanted to have the procedure first; now plan to sign on with hospice upon discharge      #Benign tremor-on propranolol And primidone     #Anxiety with  "depression  -On Remeron for sleep     #Type 2 diabetes mellitus-currently diet managed.      #Hypothyroidism-continue home levothyroxine       Diet: Regular Diet Adult      DVT Prophylaxis: mechanical with PCD boots, defer to colorectal surgery    Code status: full code    Disposition:   Medically Ready for Discharge: Anticipated Tomorrow per primary colorectal surgery service  -  following for disposition; apparently was planned to sign on to hospice but wanted to have the procedure first; now plan to sign on with hospice upon discharge    Care plan discussed with nursing and patient's family including daughter and  present in room       Clinically Significant Risk Factors        # Hypokalemia: Lowest K = 3.3 mmol/L in last 2 days, will replace as needed        # Hypomagnesemia: Lowest Mg = 1.6 mg/dL in last 2 days, will replace as needed                    # Overweight: Estimated body mass index is 25.25 kg/m  as calculated from the following:    Height as of this encounter: 1.499 m (4' 11\").    Weight as of this encounter: 56.7 kg (125 lb)., PRESENT ON ADMISSION       # Financial/Environmental Concerns: none                            Physical Exam:      Blood pressure 114/55, pulse 77, temperature 98.8  F (37.1  C), temperature source Oral, resp. rate 16, height 1.499 m (4' 11\"), weight 56.7 kg (125 lb), SpO2 99%.  Vitals:    02/19/25 0631   Weight: 56.7 kg (125 lb)     Vital Signs with Ranges  Temp:  [98.3  F (36.8  C)-98.8  F (37.1  C)] 98.8  F (37.1  C)  Pulse:  [68-79] 77  Resp:  [15-16] 16  BP: (106-126)/(52-62) 114/55  SpO2:  [98 %-100 %] 99 %  I/O's Last 24 hours  I/O last 3 completed shifts:  In: 900 [P.O.:600]  Out: 425 [Urine:425]    Constitutional: Alert, awake and oriented to self only; pleasantly confused at baseline; resting comfortably in no apparent distress       Oral cavity: Moist mucosa   Cardiovascular: Normal s1 s2, regular rate and rhythm, no murmur   Lungs: B/l clear to " auscultation, no wheezes or crepitations   Abdomen: Soft, nt, nd, no guarding, rigidity or rebound; BS +   LE : No edema   Musculoskeletal/Neuro Power 5/5 in all extremities; No focal neurological deficits noted   Psychiatry: normal mood and affect                Medications:        Current Facility-Administered Medications   Medication Dose Route Frequency Provider Last Rate Last Admin    acetaminophen (TYLENOL) tablet 1,000 mg  1,000 mg Oral Q6H Jose Camargo MD   1,000 mg at 02/21/25 0645    donepezil (ARICEPT) tablet 10 mg  10 mg Oral QPM Jose Camargo MD   10 mg at 02/20/25 1956    [Held by provider] enoxaparin ANTICOAGULANT (LOVENOX) injection 40 mg  40 mg Subcutaneous Q24H Jose Camargo MD   40 mg at 02/20/25 0901    escitalopram (LEXAPRO) tablet 20 mg  20 mg Oral At Bedtime Jose Camargo MD   20 mg at 02/20/25 2120    folic acid (FOLVITE) tablet 1,000 mcg  1,000 mcg Oral Daily Jose Camargo MD   1,000 mcg at 02/20/25 0855    levothyroxine (SYNTHROID/LEVOTHROID) tablet 112 mcg  112 mcg Oral QAM AC Jose Camargo MD   112 mcg at 02/21/25 0645    memantine XR (NAMENDA XR) 24 hr capsule 14 mg  14 mg Oral Daily Jose Camargo MD   14 mg at 02/20/25 0855    pantoprazole (PROTONIX) EC tablet 40 mg  40 mg Oral BID AC Jose Camargo MD   40 mg at 02/21/25 0645    primidone (MYSOLINE) tablet 50 mg  50 mg Oral At Bedtime Jose Camargo MD   50 mg at 02/20/25 2120    propranolol ER (INDERAL LA) 24 hr capsule 60 mg  60 mg Oral Daily Jose Camargo MD   60 mg at 02/20/25 0855    raloxifene (EVISTA) tablet 60 mg  60 mg Oral Daily Jose Camargo MD   60 mg at 02/20/25 0855    sodium chloride (PF) 0.9% PF flush 3 mL  3 mL Intracatheter Q8H Jose Camargo MD   3 mL at 02/20/25 6294     PRN Meds:   Current Facility-Administered Medications   Medication Dose Route Frequency Provider Last Rate Last Admin    benzocaine-menthol (CHLORASEPTIC) 6-10 MG lozenge 1 lozenge  1 lozenge Buccal Q1H  PRN Jose Camargo MD        HYDROmorphone (DILAUDID) injection 0.1 mg  0.1 mg Intravenous Q4H PRN Jose Camargo MD        Or    HYDROmorphone (DILAUDID) injection 0.2 mg  0.2 mg Intravenous Q4H PRN Jose Camargo MD        lidocaine (LMX4) cream   Topical Q1H PRN Jose Camargo MD        lidocaine 1 % 0.1-1 mL  0.1-1 mL Other Q1H PRN Jose Camargo MD        melatonin tablet 1 mg  1 mg Oral At Bedtime PRN Jose Camargo MD        naloxone (NARCAN) injection 0.2 mg  0.2 mg Intravenous Q2 Min PRN Nathalie Roe MD        Or    naloxone (NARCAN) injection 0.4 mg  0.4 mg Intravenous Q2 Min PRN Nathalie Roe MD        Or    naloxone (NARCAN) injection 0.2 mg  0.2 mg Intramuscular Q2 Min PRN Nathalie Roe MD        Or    naloxone (NARCAN) injection 0.4 mg  0.4 mg Intramuscular Q2 Min PRN Nathalie Roe MD        ondansetron (ZOFRAN ODT) ODT tab 4 mg  4 mg Oral Q6H PRN Jose Camargo MD        Or    ondansetron (ZOFRAN) injection 4 mg  4 mg Intravenous Q6H PRN Jose Camargo MD        oxyCODONE IR (ROXICODONE) half-tab 2.5 mg  2.5 mg Oral Q4H PRN Jose Camargo MD        Or    oxyCODONE (ROXICODONE) tablet 5 mg  5 mg Oral Q4H PRN Jsoe Camargo MD        prochlorperazine (COMPAZINE) injection 5 mg  5 mg Intravenous Q6H PRN Jose Camargo MD        Or    prochlorperazine (COMPAZINE) tablet 5 mg  5 mg Oral Q6H PRN Jose Camargo MD        sodium chloride (PF) 0.9% PF flush 3 mL  3 mL Intracatheter q1 min prn Jose Camargo MD        sodium chloride 0.9% BOLUS 1-250 mL  1-250 mL Intravenous Q1H PRN Sebas Reid MD                Data:      All new lab and imaging data was reviewed.   Recent Labs   Lab Test 02/20/25 0926 02/20/25 0706 02/19/25 2007 02/19/25 0616   WBC  --  5.1  --   --    HGB 6.6* 6.4*  --  9.5*   MCV  --  98  --   --    PLT  --  189 233  --       Recent Labs   Lab Test 02/20/25 0926 02/20/25 0706 02/19/25 2007 02/19/25 0616  "   138  --   --    POTASSIUM 3.3* 3.3*  --  2.8*   CHLORIDE 104 105  --   --    CO2 23 22  --   --    BUN 5.2* 5.4*  --   --    CR 0.79 0.77 0.90  --    ANIONGAP 11 11  --   --    LISA 8.1* 8.0*  --   --    * 94  --  167*     No lab results found.    Invalid input(s): \"TROP\", \"TROPONINIES\"      "

## 2025-02-21 NOTE — PLAN OF CARE
Goal Outcome Evaluation:      Plan of Care Reviewed With: patient, spouse    Overall Patient Progress: improvingOverall Patient Progress: improving         Date & Time: 2/20-21/25 2137-8963  Surgery/POD#: 1-2 INTRAOPERATIVE Colonoscopy, Polypectomy  OPEN Perineal Rectosigmoidectomy  Behavior & Aggression: Green  Fall Risk: Yes  Orientation: Alert to self, pleasant and cooperative   ABNL VS/O2: VSS on RA  ABNL Labs: See labs. Hgb and Potassium recheck in AM  Pain Management: Denies pain, scheduled Tylenol  Bowel/Bladder: Up to [employer] at times  Drains: PIV SL  Wounds/incisions: Perineal incision. Perineum/buttocks bright red, complains of pain in buttocks. Barrier cream applied  Diet: Reg  Activity Level: A1 GB/W  Tests/Procedures: n/a  Anticipated  DC Date: Pending  Significant Information: Colorectal surgery following. CM/SW consulted. Sit/long arm used during the night

## 2025-02-21 NOTE — PROGRESS NOTES
Care Management Follow Up    Length of Stay (days): 2    Expected Discharge Date: 02/22/2025     Concerns to be Addressed:       Patient plan of care discussed at interdisciplinary rounds: Yes    Anticipated Discharge Disposition: Hospice        Anticipated Discharge Services:    Anticipated Discharge DME: [place]    Patient/family educated on Medicare website which has current facility and service quality ratings:    Education Provided on the Discharge Plan:    Patient/Family in Agreement with the Plan: yes    Referrals Placed by CM/SW:    Private pay costs discussed: Not applicable    Discussed  Partnership in Safe Discharge Planning  document with patient/family: No     Handoff Completed: No, handoff not indicated or clinically appropriate    Additional Information:    [facility] message below     Do you know what time tomorrow patient may discharge?   Will need order for hospice to eval and treat included on the discharge orders.  Also will need a couple days worth of comfort medications sent with her.   Over the weekend, please call [phone] to speak with triage RN regarding discharge plans and fax discharge orders to [phone].     Next Steps:     Writer sent this information to provider and let [facility] know they can sign patient on Monday, Family is able and willing to manage her are in the interim     Message sent to colo rectal team to add the okay for hospice to eval and treat in the discharge to home     RILEY Honeycutt

## 2025-02-21 NOTE — PLAN OF CARE
Date & Time: 2/20/25 7843-1833  Surgery/POD#: 1 INTRAOPERATIVE Colonoscopy, Polypectomy  OPEN Perineal Rectosigmoidectomy  Behavior & Aggression: Green  Fall Risk: Yes  Orientation:Alert to self  ABNL VS/O2:VSS on RA  ABNL Labs: Hgb 6.6, RBC transfused. AM recheck  Pain Management: Scheduled Tylenol  Bowel/Bladder: Up to [employer] at times  Drains: PIV SL  Wounds/incisions: Perineal incision  Diet:Reg  Activity Level: Up with 1gb and walker  Tests/Procedures:1 unit of RBC transfused  Anticipated  DC Date: Pending  Significant Information: Colorectal surgery following. CM/SW consulted

## 2025-02-22 VITALS
BODY MASS INDEX: 25.2 KG/M2 | SYSTOLIC BLOOD PRESSURE: 145 MMHG | RESPIRATION RATE: 15 BRPM | OXYGEN SATURATION: 100 % | HEART RATE: 77 BPM | TEMPERATURE: 98.8 F | HEIGHT: 59 IN | WEIGHT: 125 LBS | DIASTOLIC BLOOD PRESSURE: 63 MMHG

## 2025-02-22 LAB
ANION GAP SERPL CALCULATED.3IONS-SCNC: 9 MMOL/L (ref 7–15)
BUN SERPL-MCNC: 7 MG/DL (ref 8–23)
CALCIUM SERPL-MCNC: 8.5 MG/DL (ref 8.8–10.4)
CHLORIDE SERPL-SCNC: 107 MMOL/L (ref 98–107)
CREAT SERPL-MCNC: 0.59 MG/DL (ref 0.51–0.95)
EGFRCR SERPLBLD CKD-EPI 2021: >90 ML/MIN/1.73M2
GLUCOSE BLDC GLUCOMTR-MCNC: 83 MG/DL (ref 70–99)
GLUCOSE SERPL-MCNC: 85 MG/DL (ref 70–99)
HCO3 SERPL-SCNC: 25 MMOL/L (ref 22–29)
HGB BLD-MCNC: 7.9 G/DL (ref 11.7–15.7)
MAGNESIUM SERPL-MCNC: 1.7 MG/DL (ref 1.7–2.3)
PLATELET # BLD AUTO: 196 10E3/UL (ref 150–450)
POTASSIUM SERPL-SCNC: 3.8 MMOL/L (ref 3.4–5.3)
SODIUM SERPL-SCNC: 141 MMOL/L (ref 135–145)

## 2025-02-22 PROCEDURE — 250N000011 HC RX IP 250 OP 636: Performed by: PHYSICIAN ASSISTANT

## 2025-02-22 PROCEDURE — 85018 HEMOGLOBIN: CPT | Performed by: HOSPITALIST

## 2025-02-22 PROCEDURE — 82310 ASSAY OF CALCIUM: CPT | Performed by: HOSPITALIST

## 2025-02-22 PROCEDURE — 85049 AUTOMATED PLATELET COUNT: CPT | Performed by: SURGERY

## 2025-02-22 PROCEDURE — 99232 SBSQ HOSP IP/OBS MODERATE 35: CPT | Performed by: HOSPITALIST

## 2025-02-22 PROCEDURE — 80048 BASIC METABOLIC PNL TOTAL CA: CPT | Performed by: HOSPITALIST

## 2025-02-22 PROCEDURE — 36415 COLL VENOUS BLD VENIPUNCTURE: CPT | Performed by: SURGERY

## 2025-02-22 PROCEDURE — 250N000013 HC RX MED GY IP 250 OP 250 PS 637: Performed by: SURGERY

## 2025-02-22 PROCEDURE — 83735 ASSAY OF MAGNESIUM: CPT | Performed by: HOSPITALIST

## 2025-02-22 RX ADMIN — ACETAMINOPHEN 1000 MG: 500 TABLET, FILM COATED ORAL at 07:02

## 2025-02-22 RX ADMIN — MEMANTINE HYDROCHLORIDE 14 MG: 14 CAPSULE, EXTENDED RELEASE ORAL at 09:33

## 2025-02-22 RX ADMIN — RALOXIFENE HYDROCHLORIDE 60 MG: 60 TABLET, FILM COATED ORAL at 09:35

## 2025-02-22 RX ADMIN — PROPRANOLOL HYDROCHLORIDE 60 MG: 60 CAPSULE, EXTENDED RELEASE ORAL at 09:33

## 2025-02-22 RX ADMIN — PANTOPRAZOLE SODIUM 40 MG: 40 TABLET, DELAYED RELEASE ORAL at 07:02

## 2025-02-22 RX ADMIN — ACETAMINOPHEN 1000 MG: 500 TABLET, FILM COATED ORAL at 13:04

## 2025-02-22 RX ADMIN — FOLIC ACID 1000 MCG: 1 TABLET ORAL at 09:33

## 2025-02-22 RX ADMIN — LEVOTHYROXINE SODIUM 112 MCG: 112 TABLET ORAL at 07:02

## 2025-02-22 RX ADMIN — ENOXAPARIN SODIUM 40 MG: 40 INJECTION SUBCUTANEOUS at 09:33

## 2025-02-22 ASSESSMENT — ACTIVITIES OF DAILY LIVING (ADL)
ADLS_ACUITY_SCORE: 73
ADLS_ACUITY_SCORE: 73
ADLS_ACUITY_SCORE: 81
ADLS_ACUITY_SCORE: 73
ADLS_ACUITY_SCORE: 76
ADLS_ACUITY_SCORE: 81
ADLS_ACUITY_SCORE: 73
ADLS_ACUITY_SCORE: 73
ADLS_ACUITY_SCORE: 81
ADLS_ACUITY_SCORE: 75
ADLS_ACUITY_SCORE: 73
ADLS_ACUITY_SCORE: 81
ADLS_ACUITY_SCORE: 73

## 2025-02-22 NOTE — PROGRESS NOTES
Pt discharged to home. Med review and education completed. Charge spoke with hospice who said they will connect with pt family directly today.

## 2025-02-22 NOTE — PROGRESS NOTES
Care Management Follow Up    Length of Stay (days): 3    Expected Discharge Date: 02/23/2025     Concerns to be Addressed:       Patient plan of care discussed at interdisciplinary rounds: Yes    Anticipated Discharge Disposition: Hospice              Anticipated Discharge Services:    Anticipated Discharge DME: [facility]    Patient/family educated on Medicare website which has current facility and service quality ratings:    Education Provided on the Discharge Plan:    Patient/Family in Agreement with the Plan: yes    Referrals Placed by CM/SW:    Private pay costs discussed:     Discussed  Partnership in Safe Discharge Planning  document with patient/family:      Handoff Completed:     Additional Information:  Care Management SW following for discharge plan to home with [facility].    Dr Nolan anticipates discharge tomorrow.  Per Charge RN, surgery is primary for this patient.  At the time of this note, Surgery has not rounded.    At time of discharge patient orders need to include orders for [facility] to evaluate and treat and three days of comfort medications filled by out discharge pharmacy.    Next Steps:   Wait for Surgery input regarding discharge date.    FRANCISCO BenitesSW

## 2025-02-22 NOTE — DISCHARGE INSTRUCTIONS
[facility] will call and let you know the time they will come out on Monday. Their phone number is [phone].

## 2025-02-22 NOTE — PROGRESS NOTES
Care Management Follow Up    Length of Stay (days): 3    Expected Discharge Date: 02/22/2025     Concerns to be Addressed:       Patient plan of care discussed at interdisciplinary rounds: Yes    Anticipated Discharge Disposition: Hospice              Anticipated Discharge Services:    Anticipated Discharge DME: [place]    Patient/family educated on Medicare website which has current facility and service quality ratings:    Education Provided on the Discharge Plan:    Patient/Family in Agreement with the Plan: yes    Referrals Placed by CM/SW:    Private pay costs discussed: Not applicable    Discussed  Partnership in Safe Discharge Planning  document with patient/family:      Handoff Completed:     Additional Information:  Surgeon has written discharge orders and hospitalist is in agreement.  Based on Friday  note,   writer contact [facility] at [phone] and sent orders thru [facility].  [facility] staff, Katey stated [facility] will notify daughter of the intake time for Monday.      Went to patient's room where daughter and  were.  Reviewed plan of [facility] planning intake for Monday.  Daughter responded she was told [facility] will be to their home on the day of discharge.  Writer referenced the Friday SW note and daughter explained she never said she would be comfortable with bringing patient home on the w/e with [facility] visit on Monday. She was visably upset by this news     Writer has called back [facility] and spoke with Katey.  She is checking with her supervisor to see what is the earliest day is that they can open patient     Discussed with bedside RN.    Next Steps: Wait to hear from [facility].    Update at 1700 Katey at [facility] called back and stated their agency is not able to offer a w/e enrollment.   Updated daughter who stated she was told during their previous meeting with hospice that they can admit patient on Saturday.    Writer called Katey back at  [phone],  She is consulting their team regarding daughter's impression.    The daughter also said irregardless she plans to take her mother home tonight because patient wants to go home tonight.      If the agency can offer admission before Monday, she will call the unit and update the bedside RN;          GERDA Benites

## 2025-02-22 NOTE — DISCHARGE SUMMARY
[facility] Discharge Summary    Tena Sy MRN# [id number]   Age: 69 year old YOB: 1955     Date of Admission:  2/19/2025  Date of Discharge::  2/22/2025  Admitting Physician:  Nathalie Roe MD  Discharge Physician:  Kadie Abreu MD             Admission Diagnoses:   Rectal prolapse [K62.3]          Discharge Diagnosis:     Rectal prolapse [K62.3]          Procedures:     Procedure(s):  Perineal rectosigmoidectomy             Medications Prior to Admission:     Medications Prior to Admission   Medication Sig Dispense Refill Last Dose/Taking    calcium citrate 250 MG TABS Take 2 tablets by mouth 2 times daily (with meals).   Taking    donepezil (ARICEPT) 10 MG tablet Take 10 mg by mouth daily.   Evening    escitalopram (LEXAPRO) 20 MG tablet Take 20 mg by mouth at bedtime.   Bedtime    fish oil-omega-3 fatty acids (OMEGA-3 FISH OIL) 1000 MG capsule Take 1 g by mouth daily.   Taking    folic acid 800 MCG tablet Take 800 mcg by mouth daily.   Taking    levothyroxine (SYNTHROID/LEVOTHROID) 112 MCG tablet Take 112 mcg by mouth every morning (before breakfast).   Morning    LORazepam (ATIVAN) 0.5 MG tablet Take 0.5 mg by mouth daily as needed for anxiety.   Taking As Needed    memantine XR (NAMENDA XR) 14 MG 24 hr capsule Take 14 mg by mouth daily.   Taking    mirtazapine (REMERON) 15 MG tablet Take 15 mg by mouth at bedtime.   Taking    multivitamin w/minerals (MULTI-VITAMIN) tablet Take 1 tablet by mouth daily.   Taking    omeprazole (PRILOSEC) 20 MG DR capsule Take 20 mg by mouth 2 times daily (before meals).   Taking    primidone (MYSOLINE) 50 MG tablet Take 50 mg by mouth at bedtime.   Bedtime    propranolol ER (INDERAL LA) 60 MG 24 hr capsule Take 60 mg by mouth daily.   Morning    raloxifene (EVISTA) 60 MG tablet Take 60 mg by mouth daily.   Morning    vitamin B-12 (CYANOCOBALAMIN) 1000 MCG tablet Take 1,000 mcg by mouth daily.   Taking             Discharge Medications:      Current Discharge Medication List        START taking these medications    Details   menthol-zinc oxide (CALMOSEPTINE) 0.44-20.6 % OINT ointment Apply topically 2 times daily as needed for skin protection.  Qty: 71 g, Refills: 0    Associated Diagnoses: Rectal prolapse           CONTINUE these medications which have NOT CHANGED    Details   calcium citrate 250 MG TABS Take 2 tablets by mouth 2 times daily (with meals).      donepezil (ARICEPT) 10 MG tablet Take 10 mg by mouth daily.      escitalopram (LEXAPRO) 20 MG tablet Take 20 mg by mouth at bedtime.      fish oil-omega-3 fatty acids (OMEGA-3 FISH OIL) 1000 MG capsule Take 1 g by mouth daily.      folic acid 800 MCG tablet Take 800 mcg by mouth daily.      levothyroxine (SYNTHROID/LEVOTHROID) 112 MCG tablet Take 112 mcg by mouth every morning (before breakfast).      LORazepam (ATIVAN) 0.5 MG tablet Take 0.5 mg by mouth daily as needed for anxiety.      memantine XR (NAMENDA XR) 14 MG 24 hr capsule Take 14 mg by mouth daily.      mirtazapine (REMERON) 15 MG tablet Take 15 mg by mouth at bedtime.      multivitamin w/minerals (MULTI-VITAMIN) tablet Take 1 tablet by mouth daily.      omeprazole (PRILOSEC) 20 MG DR capsule Take 20 mg by mouth 2 times daily (before meals).      primidone (MYSOLINE) 50 MG tablet Take 50 mg by mouth at bedtime.      propranolol ER (INDERAL LA) 60 MG 24 hr capsule Take 60 mg by mouth daily.      raloxifene (EVISTA) 60 MG tablet Take 60 mg by mouth daily.      vitamin B-12 (CYANOCOBALAMIN) 1000 MCG tablet Take 1,000 mcg by mouth daily.                   Consultations:   Social work           Hospital Course:   The patient's hospital course was unremarkable.  She recovered as anticipated and experienced no post-operative complications.     PHYSICAL EXAM:  - NAD, laying in bed  - RR  - No pain, anus looks healthy and pink, no bloody drainage. Some surrounding irritation 2/2 loose stools. Barrier cream applied.           Discharge  Instructions and Follow-Up:     Discharge diet: Regular   Discharge activity: Activity as tolerated   Discharge follow-up: Will arrange follow up    Wound care: Barrier ointment around anus PRN for loose stools and irritation            Discharge Disposition:     Discharged to home with , home hospice being arranged       Attestation:  I have reviewed today's vital signs, notes, medications, labs and imaging.    Kadie Abreu MD

## 2025-02-22 NOTE — PROGRESS NOTES
[facility]  Hospitalist Progress Note        Earl Enciso MD   02/22/2025        Interval History:        - Reports of some frequent loose stools and sore anal area; can probably order some PRN Imodium-- will defer to colorectal surgery  -potassium supplemented 3.2---> 3.6; magnesium 1.7  -stable hemoglobin 8---7.9         Assessment and Plan:        Tena Sy is a 69 year old female with dementia who underwent open perineal rectosigmoidectomy for full-thickness rectal prolapse by colorectal surgery (2/19/25).  Hospitalist service consulted for medical comanagement.     #Rectal prolapse s/p open perineal rectosigmoidectomy (2/19/25)  likely acute postoperative blood loss anemia with chronic iron deficiency anemia  Hereditary spherocytosis   - Postop management per colorectal surgery  - has been having some loose stools (present on admission per family)-- given recent surgery will defer PRN Imodium to colorectal surgery  - transfused one unit OK BC 2/20 for post op Hb 6.6--->8.0---7.9, stable    Hypokalemia  Hypomagnesemia  - K improving 2.8--> 3.8; supplementing per potassium replacement protocol; Mg 1.6--- 1.7     #Underlying dementia Alzheimer's  -Continue Aricept 10 mg daily, Namenda 14 mg daily  -Propranolol 60 mg daily  -  following for disposition; apparently was planned to sign on to hospice but wanted to have the procedure first; now plan to sign on with hospice upon discharge      #Benign tremor-on propranolol And primidone     #Anxiety with depression  -On Remeron for sleep     #Type 2 diabetes mellitus-currently diet managed.      #Hypothyroidism-continue home levothyroxine       Diet: Regular Diet Adult  Diet      DVT Prophylaxis: mechanical with PCD boots, defer to colorectal surgery    Code status: full code    Disposition:   Medically Ready for Discharge: Anticipated Today per primary colorectal surgery service  -  following for disposition;  "apparently was planned to sign on to hospice but wanted to have the procedure first; now plan to sign on with hospice upon discharge    Medically stable for discharge from hospitalist perspective    Care plan discussed with nursing and patient's  present in room       Clinically Significant Risk Factors        # Hypokalemia: Lowest K = 3.2 mmol/L in last 2 days, will replace as needed      # Hypomagnesemia: Lowest Mg = 1.6 mg/dL in last 2 days, will replace as needed                    # Overweight: Estimated body mass index is 25.25 kg/m  as calculated from the following:    Height as of this encounter: 1.499 m (4' 11\").    Weight as of this encounter: 56.7 kg (125 lb)., PRESENT ON ADMISSION       # Financial/Environmental Concerns: none                            Physical Exam:      Blood pressure 121/55, pulse 64, temperature 97.8  F (36.6  C), temperature source Oral, resp. rate 15, height 1.499 m (4' 11\"), weight 56.7 kg (125 lb), SpO2 97%.  Vitals:    02/19/25 0631   Weight: 56.7 kg (125 lb)     Vital Signs with Ranges  Temp:  [97.6  F (36.4  C)-98.6  F (37  C)] 97.8  F (36.6  C)  Pulse:  [64-74] 64  Resp:  [15-16] 15  BP: ()/(55-84) 121/55  SpO2:  [97 %-100 %] 97 %  I/O's Last 24 hours  I/O last 3 completed shifts:  In: 1010 [P.O.:1010]  Out: 50 [Urine:50]    Constitutional: Alert, awake and oriented to self only; pleasantly confused at baseline; resting comfortably in no apparent distress       Oral cavity: Moist mucosa   Cardiovascular: Normal s1 s2, regular rate and rhythm, no murmur   Lungs: B/l clear to auscultation, no wheezes or crepitations   Abdomen: Soft, nt, nd, no guarding, rigidity or rebound; BS +   LE : No edema   Musculoskeletal/Neuro Power 5/5 in all extremities; No focal neurological deficits noted   Psychiatry: normal mood and affect                Medications:        Current Facility-Administered Medications   Medication Dose Route Frequency Provider Last Rate Last Admin    " acetaminophen (TYLENOL) tablet 1,000 mg  1,000 mg Oral Q6H Jose Camargo MD   1,000 mg at 02/22/25 0702    donepezil (ARICEPT) tablet 10 mg  10 mg Oral QPM Jose Camargo MD   10 mg at 02/21/25 2138    enoxaparin ANTICOAGULANT (LOVENOX) injection 40 mg  40 mg Subcutaneous Q24H Gregory Boykin PA-C   40 mg at 02/20/25 0901    escitalopram (LEXAPRO) tablet 20 mg  20 mg Oral At Bedtime Jose Camargo MD   20 mg at 02/21/25 2138    folic acid (FOLVITE) tablet 1,000 mcg  1,000 mcg Oral Daily Jose Camargo MD   1,000 mcg at 02/21/25 0904    levothyroxine (SYNTHROID/LEVOTHROID) tablet 112 mcg  112 mcg Oral QAM AC Jose Camargo MD   112 mcg at 02/22/25 0702    memantine XR (NAMENDA XR) 24 hr capsule 14 mg  14 mg Oral Daily Jose Camargo MD   14 mg at 02/21/25 0905    pantoprazole (PROTONIX) EC tablet 40 mg  40 mg Oral BID AC Jose Camargo MD   40 mg at 02/22/25 0702    primidone (MYSOLINE) tablet 50 mg  50 mg Oral At Bedtime Jose Camargo MD   50 mg at 02/21/25 2138    propranolol ER (INDERAL LA) 24 hr capsule 60 mg  60 mg Oral Daily Jose Camargo MD   60 mg at 02/21/25 0904    raloxifene (EVISTA) tablet 60 mg  60 mg Oral Daily Jose Camargo MD   60 mg at 02/21/25 0904    sodium chloride (PF) 0.9% PF flush 3 mL  3 mL Intracatheter Q8H Jose Camargo MD   3 mL at 02/21/25 1711     PRN Meds:   Current Facility-Administered Medications   Medication Dose Route Frequency Provider Last Rate Last Admin    benzocaine-menthol (CHLORASEPTIC) 6-10 MG lozenge 1 lozenge  1 lozenge Buccal Q1H PRN Jose Camargo MD        HYDROmorphone (DILAUDID) injection 0.1 mg  0.1 mg Intravenous Q4H PRN Jose Camargo MD        Or    HYDROmorphone (DILAUDID) injection 0.2 mg  0.2 mg Intravenous Q4H PRN Jose Camargo MD        lidocaine (LMX4) cream   Topical Q1H PRN Jose Camargo MD        lidocaine 1 % 0.1-1 mL  0.1-1 mL Other Q1H PRN Jose Camargo MD        melatonin tablet 1 mg  1 mg  Oral At Bedtime PRN Jose Camargo MD        menthol-zinc oxide (CALMOSEPTINE) 0.44-20.6 % ointment OINT   Topical BID PRN Greogry Boykin PA-C        naloxone (NARCAN) injection 0.2 mg  0.2 mg Intravenous Q2 Min PRN Nathalie Roe MD        Or    naloxone (NARCAN) injection 0.4 mg  0.4 mg Intravenous Q2 Min PRN Nathalie Roe MD        Or    naloxone (NARCAN) injection 0.2 mg  0.2 mg Intramuscular Q2 Min PRN Nathalie Roe MD        Or    naloxone (NARCAN) injection 0.4 mg  0.4 mg Intramuscular Q2 Min PRN Nathalie Roe MD        ondansetron (ZOFRAN ODT) ODT tab 4 mg  4 mg Oral Q6H PRN Jose Camargo MD        Or    ondansetron (ZOFRAN) injection 4 mg  4 mg Intravenous Q6H PRN Jose Camargo MD        oxyCODONE IR (ROXICODONE) half-tab 2.5 mg  2.5 mg Oral Q4H PRN Jose Camargo MD        Or    oxyCODONE (ROXICODONE) tablet 5 mg  5 mg Oral Q4H PRN Jose Camargo MD        prochlorperazine (COMPAZINE) injection 5 mg  5 mg Intravenous Q6H PRN Jose Camargo MD        Or    prochlorperazine (COMPAZINE) tablet 5 mg  5 mg Oral Q6H PRN Jose Camargo MD        sodium chloride (PF) 0.9% PF flush 3 mL  3 mL Intracatheter q1 min prn Jose Camargo MD        sodium chloride 0.9% BOLUS 1-250 mL  1-250 mL Intravenous Q1H PRN Sebas Reid MD                Data:      All new lab and imaging data was reviewed.   Recent Labs   Lab Test 02/21/25  0730 02/20/25  0926 02/20/25  0706 02/19/25 2007   WBC  --   --  5.1  --    HGB 8.0* 6.6* 6.4*  --    MCV  --   --  98  --    PLT  --   --  189 233      Recent Labs   Lab Test 02/22/25  0402 02/21/25  1552 02/21/25  1213 02/21/25  0822 02/21/25  0730 02/20/25  0926 02/20/25  0706   NA  --   --   --   --  141 138 138   POTASSIUM  --  3.6  --   --  3.2* 3.3* 3.3*   CHLORIDE  --   --   --   --  107 104 105   CO2  --   --   --   --  25 23 22   BUN  --   --   --   --  5.6* 5.2* 5.4*   CR  --   --   --   --  0.65 0.79 0.77   ANIONGAP   "--   --   --   --  9 11 11   LISA  --   --   --   --  8.4* 8.1* 8.0*   GLC 83  --  98 79 102* 100* 94     No lab results found.    Invalid input(s): \"TROP\", \"TROPONINIES\"      "

## 2025-02-22 NOTE — PLAN OF CARE
Surgery/POD#: 3 Colonoscopy, Polypectomy  OPEN Perineal Rectosigmoidectomy  Orientation: AOx1 only to self   ABNL VS/O2: VSS on RA   ABNL Labs: see chart   Pain Management: tylenol, rectal cream   Bowel/Bladder: incontinent B&B, had loose stools this shift   Drains/IVs:  IVSL  Wounds/incisions: perineum red, excoriated, calmoseptine applied   Diet: regular   Activity Level: assist x1, ambulated in bond   Tests/Procedures:   Anticipated  DC Date: today - awaiting input from CRS  Significant Information: pleasantly confused, redirectable, swallow pills ok

## 2025-02-22 NOTE — PLAN OF CARE
Surgery/POD#: 2 Colonoscopy, Polypectomy  OPEN Perineal Rectosigmoidectomy  Orientation: AOx1 only to self   ABNL VS/O2: VSS on RA   ABNL Labs: see chart   Pain Management: tylenol, rectal cream   Bowel/Bladder: incontinent B&B, multiple small loose BMs, MD aware  Drains/IVs:  IVSL  Wounds/incisions: perineum red, excoriated, calmoseptine applied   Diet: regular   Activity Level: assist x1, ambulated in bond   Tests/Procedures:   Anticipated  DC Date: tomorrow?   Significant Information: pleasantly confused, redirectable, swallow pills ok

## 2025-02-22 NOTE — PLAN OF CARE
Goal Outcome Evaluation:    Date: 2/21-22 [id number]  Surgery/POD#: 2/3 Intraoperative Colonoscopy, Polypectomy  OPEN Perineal Rectosigmoidectomy  Orientation: AOx1 only to self   ABNL VS/O2: VSS on RA   ABNL Labs: Hgb 8.0, Ca 8.4, Mg 1.6; Mg & K protocols - rechecks in AM   Pain Management: denies  Bowel/Bladder: Voiding in BR - 1 BM this shift - incontinent at times   Drains/IVs:  IVSL  Wounds/incisions: perineum red w/ excoriation - Calmoseptine cream and barrier cream applied   Diet: regular   Activity Level: assist x1  Tests/Procedures: na  Anticipated DC Date: today?   Significant Information: very tired overnight and quiet

## 2025-07-04 ENCOUNTER — HOSPITAL ENCOUNTER (OUTPATIENT)
Age: 70
Discharge: HOME | End: 2025-07-04
Payer: MEDICARE

## 2025-07-04 DIAGNOSIS — I46.9: Primary | ICD-10-CM

## 2025-07-04 PROCEDURE — A0429 BLS-EMERGENCY: HCPCS

## (undated) DEVICE — SYR 10ML LL W/O NDL 302995

## (undated) DEVICE — ENDO SNARE EXACTO COLD 9MM LOOP 2.4MMX230CM 00711115

## (undated) DEVICE — LINEN TOWEL PACK X5 5464

## (undated) DEVICE — ESU PENCIL W/SMOKE EVAC NEPTUNE STRYKER 0703-046-000

## (undated) DEVICE — BASIN SET MINOR DISP

## (undated) DEVICE — DRAPE MINOR PROCEDURE LAP 29496

## (undated) DEVICE — SU VICRYL+ 3-0 27IN SH UND VCP416H

## (undated) DEVICE — RETR ELASTIC STAYS LONE STAR SHARP 5MM 8/PACK 3311-8G

## (undated) DEVICE — PREP POVIDONE IODINE SOLUTION 10% 120ML

## (undated) DEVICE — JELLY LUBRICATING SURGILUBE 4OZ TUBE

## (undated) DEVICE — SU VICRYL 2-0 SH 27" J317H

## (undated) DEVICE — SPONGE LAP 18X18" X8435

## (undated) DEVICE — PAD ABD 10X8IN DERMACEA ABS NWVN 4 SL EDG SFT LF STRL 7198D

## (undated) DEVICE — BAG RED BIOHAZARD

## (undated) DEVICE — DRSG KERLIX FLUFFS X5

## (undated) DEVICE — GOWN LG DISP 9515

## (undated) DEVICE — ESU LIGASURE OPEN SEALER/DIVIDER SM JAW 16.5MM LF1212A

## (undated) DEVICE — NDL 19GA 1.5"

## (undated) DEVICE — PAD CHUX UNDERPAD 30X30"

## (undated) DEVICE — ENDO TRAP POLYP E-TRAP 00711099

## (undated) DEVICE — SU VICRYL 3-0 SH CR 8X18" J774

## (undated) DEVICE — ESU CLEANER TIP 31142717

## (undated) DEVICE — TUBING SUCTION MEDI-VAC SOFT 3/16"X20' N520A

## (undated) DEVICE — RETR RING LONE STAR 14.1X14.1CM 3307G

## (undated) DEVICE — CLIP HEMOSTASIS ASSURANCE W16 MM BX00711884

## (undated) DEVICE — ESU ELEC NDL 1" E1552

## (undated) DEVICE — PACK MINOR SBA15MIFSE

## (undated) DEVICE — SPONGE BALL KERLIX ROUND XL W/O STRING LATEX 4935

## (undated) DEVICE — GLOVE BIOGEL PI ULTRATOUCH SZ 6.5 41165

## (undated) DEVICE — GLOVE BIOGEL PI MICRO INDICATOR UNDERGLOVE SZ 6.5 48965

## (undated) DEVICE — SUCTION MANIFOLD NEPTUNE 2 SYS 4 PORT 0702-020-000

## (undated) RX ORDER — PROPOFOL 10 MG/ML
INJECTION, EMULSION INTRAVENOUS
Status: DISPENSED
Start: 2025-02-19

## (undated) RX ORDER — ONDANSETRON 2 MG/ML
INJECTION INTRAMUSCULAR; INTRAVENOUS
Status: DISPENSED
Start: 2025-02-19

## (undated) RX ORDER — ACETAMINOPHEN 325 MG/1
TABLET ORAL
Status: DISPENSED
Start: 2025-02-19

## (undated) RX ORDER — FENTANYL CITRATE 50 UG/ML
INJECTION, SOLUTION INTRAMUSCULAR; INTRAVENOUS
Status: DISPENSED
Start: 2025-02-19

## (undated) RX ORDER — DEXAMETHASONE SODIUM PHOSPHATE 4 MG/ML
INJECTION, SOLUTION INTRA-ARTICULAR; INTRALESIONAL; INTRAMUSCULAR; INTRAVENOUS; SOFT TISSUE
Status: DISPENSED
Start: 2025-02-19

## (undated) RX ORDER — POTASSIUM CHLORIDE 1500 MG/1
TABLET, EXTENDED RELEASE ORAL
Status: DISPENSED
Start: 2025-02-19

## (undated) RX ORDER — HYDROMORPHONE HYDROCHLORIDE 1 MG/ML
INJECTION, SOLUTION INTRAMUSCULAR; INTRAVENOUS; SUBCUTANEOUS
Status: DISPENSED
Start: 2025-02-19

## (undated) RX ORDER — CIPROFLOXACIN 2 MG/ML
INJECTION, SOLUTION INTRAVENOUS
Status: DISPENSED
Start: 2025-02-19

## (undated) RX ORDER — METRONIDAZOLE 500 MG/100ML
INJECTION, SOLUTION INTRAVENOUS
Status: DISPENSED
Start: 2025-02-19

## (undated) RX ORDER — HEPARIN SODIUM 5000 [USP'U]/.5ML
INJECTION, SOLUTION INTRAVENOUS; SUBCUTANEOUS
Status: DISPENSED
Start: 2025-02-19

## (undated) RX ORDER — CEFAZOLIN SODIUM/WATER 2 G/20 ML
SYRINGE (ML) INTRAVENOUS
Status: DISPENSED
Start: 2025-02-19